# Patient Record
Sex: FEMALE | Race: BLACK OR AFRICAN AMERICAN | Employment: FULL TIME | URBAN - METROPOLITAN AREA
[De-identification: names, ages, dates, MRNs, and addresses within clinical notes are randomized per-mention and may not be internally consistent; named-entity substitution may affect disease eponyms.]

---

## 2017-01-06 ENCOUNTER — HOSPITAL ENCOUNTER (OUTPATIENT)
Dept: MAMMOGRAPHY | Age: 47
Discharge: HOME | End: 2017-01-06
Attending: OBSTETRICS & GYNECOLOGY
Payer: COMMERCIAL

## 2017-01-06 DIAGNOSIS — Z12.31 VISIT FOR SCREENING MAMMOGRAM: ICD-10-CM

## 2017-01-06 PROCEDURE — 77067 SCR MAMMO BI INCL CAD: CPT

## 2017-11-21 ENCOUNTER — TELEPHONE (OUTPATIENT)
Dept: FAMILY MEDICINE CLINIC | Age: 47
End: 2017-11-21

## 2017-11-21 NOTE — TELEPHONE ENCOUNTER
LM for return call    Called pt ans she states that she doesn't usually gets h/a's and this one was severe and came on quick. It was more to the center and front of her head. She felt chilled for a little bit. She took tylenol and that seem to help. It went away in a couple of hours. She denies dizziness or nausea or other sx's. Doesn't have any hangover effect. Will check with Dr Washington Found and see if she has any recommendations. No it was yesterday it only lasted a couple of hours and she feels fine now. Since she did not have any other neuro sx w/ it and subsided w/ Tylenol and she is feeling fine today, she does not need to go to the ED. Hard to know exactly what could have happened w/o hearing more details about it. I would have her monitor them for now, keep a log if the headaches are recurrent (track head pain location, rate the pain 1-10, log any associated symptoms and any triggers she might be able to identify) HOWEVER if she gets acute/severe pain again and/or any neuro sx, go to the ED. Also have OV w/ me for eval if she continues to have headaches. LM of above on pt's voice mail.

## 2017-11-21 NOTE — TELEPHONE ENCOUNTER
Patient is calling, she is requesting a call back from Dr. Ashley Lubin or her nurse, she states that she has a sudden start of a severe headache and would like to know what to do.      Best call back # for patient: [phone]

## 2018-01-10 ENCOUNTER — HOSPITAL ENCOUNTER (OUTPATIENT)
Dept: MAMMOGRAPHY | Age: 48
Discharge: HOME | End: 2018-01-10
Attending: OBSTETRICS & GYNECOLOGY
Payer: COMMERCIAL

## 2018-01-10 DIAGNOSIS — Z12.31 VISIT FOR SCREENING MAMMOGRAM: ICD-10-CM

## 2018-01-10 LAB — MAMMOGRAPHY, EXTERNAL: NORMAL

## 2018-01-10 PROCEDURE — 77067 SCR MAMMO BI INCL CAD: CPT

## 2018-11-08 ENCOUNTER — OFFICE VISIT (OUTPATIENT)
Dept: FAMILY MEDICINE CLINIC | Age: 48
End: 2018-11-08

## 2018-11-08 DIAGNOSIS — Z00.00 ROUTINE GENERAL MEDICAL EXAMINATION AT A HEALTH CARE FACILITY: Primary | ICD-10-CM

## 2018-11-08 NOTE — PATIENT INSTRUCTIONS

## 2018-11-08 NOTE — PROGRESS NOTES
Chief Complaint Patient presents with  Complete Physical  
  not fasting -has gyn for paps 1. Have you been to the ER, urgent care clinic since your last visit? Hospitalized since your last visit? No 
 
2. Have you seen or consulted any other health care providers outside of the [address] [place] [address] since your last visit? Include any pap smears or colon screening.   
Yes Dr Jenn Reyna

## 2018-11-08 NOTE — PROGRESS NOTES
HISTORY OF PRESENT ILLNESS  
HPI Annual CPE, not fasting today. Overall has been getting along well and feeling well in general. 
She sees gyn annually for well woman visits/gyn exams and had reportedly negative pap in 1-2018 Gets mammogram screens annually / [facility], also up to date in 1-2018. She had a hysterectomy in  for AUB and anemia due to uterine fibroids. She has therefore been off iron and mvi now that she no longer has the heavy periods. In general she feels well. Some cold intolerance. REVIEW OF SYMPTOMS  
  Review of Systems Constitutional: Negative. HENT: Negative. Eyes: Negative. Respiratory: Negative. Cardiovascular: Negative. Gastrointestinal: Negative. Genitourinary: Negative. Musculoskeletal: Negative. Skin: Negative. Neurological: Negative. Psychiatric/Behavioral: Negative.   
 
 
  
 PROBLEM LIST/MEDICAL HISTORY  
  
Problem List  Date Reviewed: 11/8/2018 Codes Class Noted Environmental and seasonal allergies ICD-10-CM: J30.89 ICD-9-CM: 477.8  7/8/2016 Iron deficiency anemia ICD-10-CM: D50.9 ICD-9-CM: 280.9  5/2/2016 Overview Signed 5/2/2016  2:04 PM by Mykel Zeng MD  
  Mild Screening for cardiovascular condition ICD-10-CM: Z13.6 ICD-9-CM: V81.2  3/27/2015 Overview Addendum 11/8/2018  3:00 PM by MD Dr Carmen Barnett [facility] [phone] 3[place]: 2D Echo, 24 Hour Holter Monitor, Cardiac MRI, ETT stress test, appts annually Family history of sudden cardiac death (SCD) ICD-10-CM: Z82.41 
ICD-9-CM: V17.41  3/27/2015 Family history of cardiac arrhythmia ICD-10-CM: Z82.49 
ICD-9-CM: V17.49  3/27/2015 Vitamin D deficiency ICD-10-CM: E55.9 ICD-9-CM: 268.9  5/13/2011 Overview Signed 5/13/2011 12:17 PM by Mykel Zeng MD  
  6/5028 Seasonal allergic rhinitis ICD-10-CM: J30.2 ICD-9-CM: 477.9  1/29/2010 Overview Signed 2011 12:17 PM by Mary Fernandes MD  
  Spring/ Eczema ICD-10-CM: L30.9 ICD-9-CM: 692.9  2010  
   
  
 
 
  PAST SURGICAL HISTORY  
   
Past Surgical History:  
Procedure Laterality Date  HX BREAST BIOPSY Right   
 benign  HX  SECTION  3/2008, 2006  HX MYOMECTOMY  2002  HX TONSIL AND ADENOIDECTOMY  48  
 age 17  
[facility] TONSIL AND ADENOIDECTOMY  HX TOTAL LAPAROSCOPIC HYSTERECTOMY  2016 Uterine Fibroids, Dr Ruel Jara  [facility] BX BREAST RT 1ST LESION W/CLIP AND SPECIMEN Right 2006  
 benign MEDICATIONS  
  
Current Outpatient Medications Medication Sig  cetirizine (ZYRTEC) 10 mg tablet Take 10 mg by mouth daily as needed. No current facility-administered medications for this visit. ALLERGIES No Known Allergies SOCIAL HISTORY  
   
Social History Socioeconomic History  Marital status:  Spouse name: Not on file  Number of children: 2  
 Years of education: Not on file  Highest education level: Not on file Social Needs  Financial resource strain: Not on file  Food insecurity - worry: Not on file  Food insecurity - inability: Not on file  Transportation needs - medical: Not on file  Transportation needs - non-medical: Not on file Occupational History  Occupation: Dermatologist  
Tobacco Use  Smoking status: Never Smoker  Smokeless tobacco: Never Used Substance and Sexual Activity  Alcohol use: Yes Comment: RARELY  Drug use: No  
 Sexual activity: Yes  
  Partners: Male Comment: Stopped Seasonique BCP May 2013;  had Vasectomy Other Topics Concern [address] Service Not Asked  Blood Transfusions Not Asked  Caffeine Concern No  
  Comment: 1-2 cups of coffee a day  Occupational Exposure Not Asked Filiberto Callahan Hazards Not Asked  Sleep Concern Not Asked  Stress Concern Not Asked  Weight Concern No  
 Special Diet No  
 Back Care Not Asked  Exercise No  
  Comment: runs sporadically, maybe once every 1-2 weeks, used to run 2-3 x a week  Bike Helmet Not Asked  Seat Belt Not Asked  Self-Exams Not Asked Social History Narrative 2 sons  
 
  
IMMUNIZATIONS Immunization History Administered Date(s) Administered  Influenza Vaccine 09/01/2018  TDAP Vaccine 02/25/2009 FAMILY HISTORY Family History Problem Relation Age of Onset  Hypertension Mother  Thyroid Disease Mother 79 Hypothyroidism [facility] Other Mother PVD  Hypertension Father  Heart Disease Father A. Fib  
 Parkinson's Disease Father  Arrhythmia Father AFIB  Asthma Son  Diabetes Maternal Grandmother  Heart Disease Maternal Grandmother  Heart Disease Maternal Grandfather  Heart Disease Paternal Grandmother [de-identified]  
 Heart Disease Brother 25  
     sudden death, cardiac arrhythmia  
 Heart Disease Brother A. Fib.  Arrhythmia Brother AFIB  Heart Attack Sister 47  
     sudden MI, successfully resuscitate, defibrillator in place  Anesth Problems Neg Hx Carlosa Fay Visit Vitals Ht (P) 5' 5.5\" (1.664 m) Wt (P) 145 lb 3.2 oz (65.9 kg) LMP 11/16/2016 BMI (P) 23.80 kg/m² PHYSICAL EXAMINATION  
  Physical Exam  
Constitutional: She is oriented to person, place, and time and well-developed, well-nourished, and in no distress. HENT:  
Right Ear: Tympanic membrane normal.  
Left Ear: Tympanic membrane normal.  
Nose: Nose normal.  
Mouth/Throat: Oropharynx is clear and moist. No oropharyngeal exudate. Eyes: Conjunctivae and EOM are normal. Pupils are equal, round, and reactive to light. Neck: Neck supple. No thyromegaly present. Cardiovascular: Normal rate, regular rhythm and normal heart sounds. No murmur heard.  
Pulmonary/Chest: Effort normal and breath sounds normal.  
 Abdominal: Soft. Bowel sounds are normal. She exhibits no distension and no mass. There is no tenderness. Musculoskeletal: Normal range of motion. She exhibits no edema or tenderness. Lymphadenopathy:  
  She has no cervical adenopathy. Neurological: She is alert and oriented to person, place, and time. Gait normal.  
Psychiatric: Mood and affect normal.  
Vitals reviewed. 
 
 
  
 
 ASSESSMENT & PLAN  
 
  ICD-10-CM ICD-9-CM 1. Routine general medical examination at a health care facility Z00.00 V70.0 CBC W/O DIFF  
   LIPID PANEL  
   METABOLIC PANEL, COMPREHENSIVE  
   TSH 3RD GENERATION  
   VITAMIN D, 25 HYDROXY She will RTC fasting for the above labs in the next week Reviewed diet, nutrition, exercise, weight management, BMI/goals, age/risk based screening recommendations, health maintenance & prevention counseling. Colorectal cancer screening guidelines per USPTFS and ACS r/w pt today. She is otherwise low risk, no family hx, and will check to see if her insurance covers screening down to age 41 or 50 for baseline. She sees gyn annually for well woman visits/gyn exams and had reportedly negative pap in 1-2018 Gets mammogram screens annually w/ [employer], also up to date in 1-2018. She had a hysterectomy in  for AUB and anemia due to uterine fibroids. Further follow up & other recommendations pending review of labs.  If all remains good and stable, follow up in 1yr, sooner prn

## 2019-02-01 ENCOUNTER — HOSPITAL ENCOUNTER (OUTPATIENT)
Dept: MAMMOGRAPHY | Age: 49
Discharge: HOME | End: 2019-02-01
Attending: FAMILY MEDICINE
Payer: COMMERCIAL

## 2019-02-01 DIAGNOSIS — Z12.31 ENCOUNTER FOR MAMMOGRAM TO ESTABLISH BASELINE MAMMOGRAM: ICD-10-CM

## 2019-02-01 PROCEDURE — 77063 BREAST TOMOSYNTHESIS BI: CPT

## 2019-04-26 LAB
25(OH)D3+25(OH)D2 SERPL-MCNC: 10.5 NG/ML (ref 30–100)
ALBUMIN SERPL-MCNC: 4.3 G/DL (ref 3.5–5.5)
ALBUMIN/GLOB SERPL: 1.9 {RATIO} (ref 1.2–2.2)
ALP SERPL-CCNC: 38 IU/L (ref 39–117)
ALT SERPL-CCNC: 14 IU/L (ref 0–32)
AST SERPL-CCNC: 13 IU/L (ref 0–40)
BILIRUB SERPL-MCNC: 0.4 MG/DL (ref 0–1.2)
BUN SERPL-MCNC: 11 MG/DL (ref 6–24)
BUN/CREAT SERPL: 15 (ref 9–23)
CALCIUM SERPL-MCNC: 9.3 MG/DL (ref 8.7–10.2)
CHLORIDE SERPL-SCNC: 105 MMOL/L (ref 96–106)
CHOLEST SERPL-MCNC: 167 MG/DL (ref 100–199)
CO2 SERPL-SCNC: 25 MMOL/L (ref 20–29)
CREAT SERPL-MCNC: 0.74 MG/DL (ref 0.57–1)
ERYTHROCYTE [DISTWIDTH] IN BLOOD BY AUTOMATED COUNT: 13.9 % (ref 12.3–15.4)
GLOBULIN SER CALC-MCNC: 2.3 G/DL (ref 1.5–4.5)
GLUCOSE SERPL-MCNC: 84 MG/DL (ref 65–99)
HCT VFR BLD AUTO: 36.8 % (ref 34–46.6)
HDLC SERPL-MCNC: 61 MG/DL
HGB BLD-MCNC: 12.2 G/DL (ref 11.1–15.9)
INTERPRETATION, 910389: NORMAL
LDLC SERPL CALC-MCNC: 98 MG/DL (ref 0–99)
MCH RBC QN AUTO: 29.5 PG (ref 26.6–33)
MCHC RBC AUTO-ENTMCNC: 33.2 G/DL (ref 31.5–35.7)
MCV RBC AUTO: 89 FL (ref 79–97)
PLATELET # BLD AUTO: 216 X10E3/UL (ref 150–379)
POTASSIUM SERPL-SCNC: 4.6 MMOL/L (ref 3.5–5.2)
PROT SERPL-MCNC: 6.6 G/DL (ref 6–8.5)
RBC # BLD AUTO: 4.13 X10E6/UL (ref 3.77–5.28)
SODIUM SERPL-SCNC: 143 MMOL/L (ref 134–144)
TRIGL SERPL-MCNC: 41 MG/DL (ref 0–149)
TSH SERPL DL<=0.005 MIU/L-ACNC: 2.15 UIU/ML (ref 0.45–4.5)
VLDLC SERPL CALC-MCNC: 8 MG/DL (ref 5–40)
WBC # BLD AUTO: 4.1 X10E3/UL (ref 3.4–10.8)

## 2019-05-19 ENCOUNTER — PATIENT MESSAGE (OUTPATIENT)
Dept: FAMILY MEDICINE CLINIC | Age: 49
End: 2019-05-19

## 2020-07-02 ENCOUNTER — HOSPITAL ENCOUNTER (OUTPATIENT)
Dept: MAMMOGRAPHY | Age: 50
Discharge: HOME | End: 2020-07-02
Attending: FAMILY MEDICINE
Payer: COMMERCIAL

## 2020-07-02 DIAGNOSIS — Z12.31 BREAST CANCER SCREENING BY MAMMOGRAM: ICD-10-CM

## 2020-07-02 PROCEDURE — 77063 BREAST TOMOSYNTHESIS BI: CPT

## 2020-10-19 ENCOUNTER — HOSPITAL ENCOUNTER (OUTPATIENT)
Dept: PREADMISSION TESTING | Age: 50
Discharge: HOME | End: 2020-10-19
Payer: COMMERCIAL

## 2020-10-19 PROCEDURE — 87635 SARS-COV-2 COVID-19 AMP PRB: CPT

## 2020-10-20 LAB
HEALTH STATUS, XMCV2T: NORMAL
SARS-COV-2, COV2NT: NOT DETECTED
SOURCE, COVRS: NORMAL
SPECIMEN SOURCE, FCOV2M: NORMAL
SPECIMEN TYPE, XMCV1T: NORMAL

## 2020-10-23 ENCOUNTER — ANESTHESIA (OUTPATIENT)
Dept: ENDOSCOPY | Age: 50
End: 2020-10-23
Payer: COMMERCIAL

## 2020-10-23 ENCOUNTER — ANESTHESIA EVENT (OUTPATIENT)
Dept: ENDOSCOPY | Age: 50
End: 2020-10-23
Payer: COMMERCIAL

## 2020-10-23 ENCOUNTER — HOSPITAL ENCOUNTER (OUTPATIENT)
Age: 50
Setting detail: OUTPATIENT SURGERY
Discharge: HOME | End: 2020-10-23
Attending: INTERNAL MEDICINE | Admitting: INTERNAL MEDICINE
Payer: COMMERCIAL

## 2020-10-23 VITALS
WEIGHT: 145 LBS | OXYGEN SATURATION: 100 % | SYSTOLIC BLOOD PRESSURE: 120 MMHG | HEIGHT: 65 IN | BODY MASS INDEX: 24.16 KG/M2 | TEMPERATURE: 97.6 F | HEART RATE: 60 BPM | DIASTOLIC BLOOD PRESSURE: 56 MMHG | RESPIRATION RATE: 16 BRPM

## 2020-10-23 PROCEDURE — 74011000250 HC RX REV CODE- 250: Performed by: NURSE ANESTHETIST, CERTIFIED REGISTERED

## 2020-10-23 PROCEDURE — 76040000007: Performed by: INTERNAL MEDICINE

## 2020-10-23 PROCEDURE — 74011250636 HC RX REV CODE- 250/636: Performed by: NURSE ANESTHETIST, CERTIFIED REGISTERED

## 2020-10-23 PROCEDURE — 74011250636 HC RX REV CODE- 250/636: Performed by: INTERNAL MEDICINE

## 2020-10-23 PROCEDURE — 76060000032 HC ANESTHESIA 0.5 TO 1 HR: Performed by: INTERNAL MEDICINE

## 2020-10-23 PROCEDURE — 2709999900 HC NON-CHARGEABLE SUPPLY: Performed by: INTERNAL MEDICINE

## 2020-10-23 RX ORDER — LIDOCAINE HYDROCHLORIDE 20 MG/ML
5 SOLUTION OROPHARYNGEAL AS NEEDED
Status: DISCONTINUED | OUTPATIENT
Start: 2020-10-23 | End: 2020-10-23 | Stop reason: HOSPADM

## 2020-10-23 RX ORDER — DEXTROMETHORPHAN/PSEUDOEPHED 2.5-7.5/.8
1.2 DROPS ORAL
Status: DISCONTINUED | OUTPATIENT
Start: 2020-10-23 | End: 2020-10-23 | Stop reason: HOSPADM

## 2020-10-23 RX ORDER — SODIUM CHLORIDE 0.9 % (FLUSH) 0.9 %
5-40 SYRINGE (ML) INJECTION EVERY 8 HOURS
Status: DISCONTINUED | OUTPATIENT
Start: 2020-10-23 | End: 2020-10-23 | Stop reason: HOSPADM

## 2020-10-23 RX ORDER — SODIUM CHLORIDE 9 MG/ML
100 INJECTION, SOLUTION INTRAVENOUS CONTINUOUS
Status: DISCONTINUED | OUTPATIENT
Start: 2020-10-23 | End: 2020-10-23 | Stop reason: HOSPADM

## 2020-10-23 RX ORDER — ATROPINE SULFATE 0.1 MG/ML
0.5 INJECTION INTRAVENOUS
Status: DISCONTINUED | OUTPATIENT
Start: 2020-10-23 | End: 2020-10-23 | Stop reason: HOSPADM

## 2020-10-23 RX ORDER — SODIUM CHLORIDE 0.9 % (FLUSH) 0.9 %
5-40 SYRINGE (ML) INJECTION AS NEEDED
Status: DISCONTINUED | OUTPATIENT
Start: 2020-10-23 | End: 2020-10-23 | Stop reason: HOSPADM

## 2020-10-23 RX ORDER — FENTANYL CITRATE 50 UG/ML
100 INJECTION, SOLUTION INTRAMUSCULAR; INTRAVENOUS ONCE
Status: DISCONTINUED | OUTPATIENT
Start: 2020-10-23 | End: 2020-10-23 | Stop reason: HOSPADM

## 2020-10-23 RX ORDER — MIDAZOLAM HYDROCHLORIDE 1 MG/ML
5 INJECTION, SOLUTION INTRAMUSCULAR; INTRAVENOUS
Status: DISCONTINUED | OUTPATIENT
Start: 2020-10-23 | End: 2020-10-23 | Stop reason: HOSPADM

## 2020-10-23 RX ORDER — LIDOCAINE HYDROCHLORIDE 20 MG/ML
INJECTION, SOLUTION EPIDURAL; INFILTRATION; INTRACAUDAL; PERINEURAL AS NEEDED
Status: DISCONTINUED | OUTPATIENT
Start: 2020-10-23 | End: 2020-10-23 | Stop reason: HOSPADM

## 2020-10-23 RX ORDER — SODIUM CHLORIDE 9 MG/ML
INJECTION, SOLUTION INTRAVENOUS
Status: DISCONTINUED | OUTPATIENT
Start: 2020-10-23 | End: 2020-10-23 | Stop reason: HOSPADM

## 2020-10-23 RX ORDER — NALOXONE HYDROCHLORIDE 0.4 MG/ML
0.4 INJECTION, SOLUTION INTRAMUSCULAR; INTRAVENOUS; SUBCUTANEOUS
Status: DISCONTINUED | OUTPATIENT
Start: 2020-10-23 | End: 2020-10-23 | Stop reason: HOSPADM

## 2020-10-23 RX ORDER — GLYCOPYRROLATE 0.2 MG/ML
INJECTION INTRAMUSCULAR; INTRAVENOUS AS NEEDED
Status: DISCONTINUED | OUTPATIENT
Start: 2020-10-23 | End: 2020-10-23 | Stop reason: HOSPADM

## 2020-10-23 RX ORDER — PROPOFOL 10 MG/ML
INJECTION, EMULSION INTRAVENOUS AS NEEDED
Status: DISCONTINUED | OUTPATIENT
Start: 2020-10-23 | End: 2020-10-23 | Stop reason: HOSPADM

## 2020-10-23 RX ORDER — DEXTROSE MONOHYDRATE AND SODIUM CHLORIDE 5; .9 G/100ML; G/100ML
100 INJECTION, SOLUTION INTRAVENOUS CONTINUOUS
Status: DISCONTINUED | OUTPATIENT
Start: 2020-10-23 | End: 2020-10-23 | Stop reason: HOSPADM

## 2020-10-23 RX ORDER — EPINEPHRINE 0.1 MG/ML
1 INJECTION INTRACARDIAC; INTRAVENOUS
Status: DISCONTINUED | OUTPATIENT
Start: 2020-10-23 | End: 2020-10-23 | Stop reason: HOSPADM

## 2020-10-23 RX ORDER — FLUMAZENIL 0.1 MG/ML
0.2 INJECTION INTRAVENOUS
Status: DISCONTINUED | OUTPATIENT
Start: 2020-10-23 | End: 2020-10-23 | Stop reason: HOSPADM

## 2020-10-23 RX ADMIN — PROPOFOL 20 MG: 10 INJECTION, EMULSION INTRAVENOUS at 09:50

## 2020-10-23 RX ADMIN — LIDOCAINE HYDROCHLORIDE 50 MG: 20 INJECTION, SOLUTION EPIDURAL; INFILTRATION; INTRACAUDAL; PERINEURAL at 09:33

## 2020-10-23 RX ADMIN — SODIUM CHLORIDE 100 ML/HR: 900 INJECTION, SOLUTION INTRAVENOUS at 09:30

## 2020-10-23 RX ADMIN — PROPOFOL 60 MG: 10 INJECTION, EMULSION INTRAVENOUS at 09:33

## 2020-10-23 RX ADMIN — PROPOFOL 20 MG: 10 INJECTION, EMULSION INTRAVENOUS at 09:48

## 2020-10-23 RX ADMIN — PROPOFOL 20 MG: 10 INJECTION, EMULSION INTRAVENOUS at 09:56

## 2020-10-23 RX ADMIN — PROPOFOL 20 MG: 10 INJECTION, EMULSION INTRAVENOUS at 09:38

## 2020-10-23 RX ADMIN — PROPOFOL 20 MG: 10 INJECTION, EMULSION INTRAVENOUS at 09:35

## 2020-10-23 RX ADMIN — GLYCOPYRROLATE 0.2 MG: 0.2 INJECTION, SOLUTION INTRAMUSCULAR; INTRAVENOUS at 09:33

## 2020-10-23 RX ADMIN — PROPOFOL 20 MG: 10 INJECTION, EMULSION INTRAVENOUS at 09:54

## 2020-10-23 RX ADMIN — PROPOFOL 20 MG: 10 INJECTION, EMULSION INTRAVENOUS at 09:52

## 2020-10-23 RX ADMIN — PROPOFOL 20 MG: 10 INJECTION, EMULSION INTRAVENOUS at 09:37

## 2020-10-23 RX ADMIN — SODIUM CHLORIDE: 900 INJECTION, SOLUTION INTRAVENOUS at 09:23

## 2020-10-23 RX ADMIN — PROPOFOL 40 MG: 10 INJECTION, EMULSION INTRAVENOUS at 09:42

## 2020-10-23 RX ADMIN — PROPOFOL 40 MG: 10 INJECTION, EMULSION INTRAVENOUS at 09:46

## 2020-10-23 RX ADMIN — PROPOFOL 20 MG: 10 INJECTION, EMULSION INTRAVENOUS at 09:45

## 2020-10-23 RX ADMIN — PROPOFOL 20 MG: 10 INJECTION, EMULSION INTRAVENOUS at 09:34

## 2020-10-23 NOTE — ANESTHESIA PREPROCEDURE EVALUATION
Relevant Problems   No relevant active problems       Anesthetic History   No history of anesthetic complications            Review of Systems / Medical History  Patient summary reviewed, nursing notes reviewed and pertinent labs reviewed    Pulmonary  Within defined limits                 Neuro/Psych   Within defined limits           Cardiovascular  Within defined limits                Exercise tolerance: >4 METS     GI/Hepatic/Renal  Within defined limits              Endo/Other  Within defined limits           Other Findings              Physical Exam    Airway  Mallampati: II  TM Distance: 4 - 6 cm  Neck ROM: normal range of motion   Mouth opening: Normal     Cardiovascular  Regular rate and rhythm,  S1 and S2 normal,  no murmur, click, rub, or gallop             Dental  No notable dental hx       Pulmonary  Breath sounds clear to auscultation               Abdominal  GI exam deferred       Other Findings            Anesthetic Plan    ASA: 2  Anesthesia type: general and total IV anesthesia          Induction: Intravenous  Anesthetic plan and risks discussed with: Patient      Propofol MAC

## 2020-10-23 NOTE — ANESTHESIA POSTPROCEDURE EVALUATION
Procedure(s):  COLONOSCOPY. general, total IV anesthesia    Anesthesia Post Evaluation        Patient location during evaluation: PACU  Note status: Adequate. Level of consciousness: responsive to verbal stimuli and sleepy but conscious  Pain management: satisfactory to patient  Airway patency: patent  Anesthetic complications: no  Cardiovascular status: acceptable  Respiratory status: acceptable  Hydration status: acceptable  Comments: +Post-Anesthesia Evaluation and Assessment    Patient: Nancy Jay MRN: [id number]  SSN: [id number]   YOB: 1970  Age: 52 y.o. Sex: female      Cardiovascular Function/Vital Signs    BP (!) 120/56   Pulse 60   Temp 36.4 °C (97.6 °F)   Resp 16   Ht 5' 5\" (1.651 m)   Wt 65.8 kg (145 lb)   SpO2 100%   BMI 24.13 kg/m²     Patient is status post Procedure(s):  COLONOSCOPY. Nausea/Vomiting: Controlled. Postoperative hydration reviewed and adequate. Pain:  Pain Scale 1: Numeric (0 - 10) (10/23/20 1022)  Pain Intensity 1: 0 (10/23/20 1022)   Managed. Neurological Status: At baseline. Mental Status and Level of Consciousness: Arousable. Pulmonary Status:   O2 Device: Room air (10/23/20 1022)   Adequate oxygenation and airway patent. Complications related to anesthesia: None    Post-anesthesia assessment completed. No concerns. Signed By: Jo-Ann Bernal MD    10/23/2020  Post anesthesia nausea and vomiting:  controlled      INITIAL Post-op Vital signs:   Vitals Value Taken Time   /79 10/23/2020 10:30 AM   Temp 36.4 °C (97.6 °F) 10/23/2020 10:22 AM   Pulse 56 10/23/2020 10:31 AM   Resp 17 10/23/2020 10:31 AM   SpO2 100 % 10/23/2020 10:31 AM   Vitals shown include unvalidated device data.

## 2020-10-23 NOTE — DISCHARGE INSTR - LAB
Endoscopy Discharge Instructions Dr. Otis Schirmer [place] office  NAME: Huyen Jaffe RECORD QCHOKJ:[id number] AGE:  52 y.o. YOB: 1970 FINAL Discharge Procedure and Diagnosis:   
  
Procedure(s): 
COLONOSCOPY FINDINGS:    
Normal exam                    
  
 
  
  
   
 
   MEDICATIONS [x] CONTINUE CURRENT MEDICATIONS [] NEW MEDICATIONS 1.  
 2.  
 3.  
   
 
Testing Schedule Colonoscopy Screening                                   Recommendations 
 
   []  Repeat colonoscopy in 6-12 month 2nd        to Inadequate  prep  
 []  Repeat colonoscopy in 3 years  
 []  Repeat colonoscopy in 5 years  
 [x]  Repeat colonoscopy in 10 years yearly stools for occult blood New additional 
Tests Call the office  
([phone]) for the appointment time    
 []  
 
 []  
 
 []  
  
  
   
  
                     [address] [facility]:   
                                                                                                                 
 
 
    
 [x]   None follow up with pcp []  1 week     
 []   2 week  
 []  1 month Always keep Dara Mariscal MD for regular medical follow up If you had a colonoscopy the \"C\" indicates specific instructions    
  
x                                           Diet Instructions : 
 Ordinarily you may resume your previous diet but your initial diet should be       Light your discharge nurse will go over this with you. Large meals can cause  abdominal discomfort after these procedures. Specific Diet Recommendations:  
     [x] High fiber diet. https://www.indoo.rs/. com/diets/ 
 
    [] GERD diet: avoid fried and fatty foods, peppermint, chocolate, alcohol,    
          coffee, citrus fruits and juices, and tomato products. Avoid lying down for 
          2 to 3  hours after eating. https://www.small.com/. com/diets/      
     []  FODMAP DIET  DeathUnit.nl      
 
     []  All diets eg high fiber, gastroparesis. , weight loss , gluten free 1. Flazio 2.  https://www.SAFCell. [employer]/diets/  
       
__x__  Yamila Castillo may feel quite tired and need to rest and recuperate for several hours    following these procedures. __x__  Due to the fact that sedation was administered for this procedure, do not drive,   operate machinery or sign legal documents for the next 24 hours. __x__  Mild abdominal pain may be experienced after your procedure, but is should   disappear after several hours. Notify your physician if you have persistent pain,   tenderness or abdominal distension. __x__  C Many patients for the first few hours following the exam may experience         belching or passing gas through the rectum. Walking may help to relieve      
 distention and gas pains. A warm bath or shower will often help with abdominal  cramping.                                                                                        
  
__x__   Yamila Castillo may return to your normal routine tomorrow, according to how you feel        and depending on your doctors instructions. Be sure to call your doctor to make  an appointment for a post-surgery check-up on the date your doctor has   requested. __x__ C  Rectal bleeding or spotting in small amounts may occur with the first bowel   movement following a colonoscopy or sigmoidoscopy. If a large amount of blood is noted call immediately __x__  Mamie Fung may experience a numbness or lack of sensation in throat. If present, do not   
 eat or drink. Before eating, test your ability to drink with small sips of water. Y     You may try clear liquids or soups. If you tolerate these, you may then eat solid     food which is not greasy or spicy. __x__ C   
 IF POLYPS REMOVED: Avoid any blood thinning medication such as plavix,   aspirin or coumadin  NSAIDS (like advil or alleve) for 7 days. __x__  Notify your physician if you cough or vomit blood or experience chest pain. Your biopsy or testing result should be available in 7-10 days Prescription will be electronically sent to your pharmacy you must  
  let your nurse know your pharmacy:  
                                                                                                                               
 
     [address] TO HELP ENSURE A SMOOTH RECOVERY,  
    IT IS IMPORTANT TO FOLLOW THEM. _x___Pamphlet /Educational Information provided for diagnostic findings Additional education information can assessed at the sites below: 
 [facility] 
 http://www.digestive. niddk.nih.gov/ddiseases/a-z.asp Web MD patient information Signature of individual given instructions : 
 Date: 10/23/2020

## 2020-10-23 NOTE — PROCEDURES
G I Procedure Note            COLONOSCOPY   Dr. Jean Paul [facility] office   [facility]  [address]                                   [id number]                                  [id number]   1970                                      52 y.o.                                    female      Procedure Date: 10/23/2020   [x]  Anesthesia MAC                                                                                                Pre Op Diagnosis:    Indications:                   1. SCREENING                                                                                                                                                                          Post Op Diagnosis:                    1.   Normal    Except grade 1 hemorrhoids                                                                        H&p completed: Yes            Anesthesia Assessment: Performed prior to procedure:      No change  Anesthesia Plan: Performed prior to procedure:                   No change       Medications: See Reviewed List and Reconcilation           Informed consent was obtained     Risk Statement:  Prior to the procedure the risks were explained to the patient and/or to the family including but not limited to perforation, bleeding, adverse drug reaction, aspiration, and even the need for possible surgery. A colonoscopy exam is not 100% accurate which may be related to preparation or blind spots during the exam.The possibility that an abnormality and /or cancer could be missed was also discussed as well as alternative x-ray options.          Instrument:    Olympus adult Videocolonoscope                                   Immediate Procedure Reassessment Completed     With the patient in the left lateral position, a rectal examination was performed and the findings were: negative without mass, lesions or tenderness   The Olympus Video colonoscope was inserted under direct vision into the rectum. The colonoscope was passed from the rectum to the cecum, which was identified by the ileocecal valve. The colon findings demonstrated:  ANUS: Anal exam reveals no masses or external hemorrhoids, sphincter tone is normal.   RECTUM: Rectal exam reveals no masses  . SIGMOID COLON: The sigmoid was unremarkable except as noted below   Findings below   DESCENDING COLON:  The videoscolonoscope was advanced carefully. Findings below  SPLENIC FLEXURE: The splenic flexure is normal.   TRANSVERSE COLON:  The typical triangular pattern was noted. Findings below      HEPATIC FLEXURE: The hepatic flexure is normal.   ASCENDING COLON:  No  bleeding Findings below     CECUM:  The ileocecal valve appears normal.   TERMINAL ILEUM: The terminal ileum was not entered. Finding noted      [x] mucosa normal      [] Diverticulosis     [] avm     [] Additional findings:      A     The colonoscope was slowly withdrawn >6 minute period and the instrument was retroflexed in the rectum. The rectal findings were:Protruding lesions:     -Internal Hemorrhoids  The patient tolerated the entire procedure well. Blood Loss nil  No complications  Anesthesia  MAC  No crystalloids  No Implants  Assistants : per nursing documentation team members     For biopsy  Specimen verification by physician and nurse two sources, name,           social security numbers     Colon preparation was fair    Recommendations:     - For colon cancer screening in this average-risk patient, colonoscopy may be repeated in 10 years.      - yearly stools for occult blood      Copies sent to   Libra Cason MD  CC:  Vijaya Anderson MD

## 2020-10-23 NOTE — H&P
G I Procedure Note           Endoscopy History and Physical               Dr. Read [facility] Office     [facility] -  [address] [id number]  [id number]    1970  52 y.o.  female      Date of Procedure:   Preoperative Diagnosis:       Procedure:    10/23/2020           SCREENING                              Procedure(s):  COLONOSCOPY      Gastroenterologist:  Anesthesia:           MD PETE Chaudhari            History and procedure indication:  Hugo Hansen is a 52 y.o. BLACK OR  female who presents with: SCREENING   including the additional history of Screening ,Screening for colon cancer,,        Past Medical History:   Diagnosis Date    Anemia NEC     Contact dermatitis and other eczema, due to unspecified cause     Environmental and seasonal allergies 7/8/2016    Family history of cardiac arrhythmia 3/27/2015    Family history of sudden cardiac death (SCD) 3/27/2015    Hx of mammogram 01/10/2018    Dr Sarbjit Ba mammographic evidence of malignancy-repeat in one year    Iron deficiency anemia 5/2/2016    Mild     Menopause     [id number]    Menorrhagia due to fibroids 7/8/2016    Gyn Dr Ana Wahl for cardiovascular condition 3/27/2015    Dr Jacobo Zamudio [facility] 1-2015 through 3-2015: 2D Echo, 24 Hour Holter Monitor, Cardiac MRI, ETT stress test    Uterine Fibroids 7/8/2016    Multiple; s/p myomectomy 2002, then recurred    Vitamin D deficiency 5/13/2011      Prior to Admission medications    Medication Sig Start Date End Date Taking? Authorizing Provider   cetirizine (ZYRTEC) 10 mg tablet Take 10 mg by mouth daily as needed.  5/13/11  Yes Provider, Historical     No Known Allergies    Past Surgical History:   Procedure Laterality Date    HX BREAST BIOPSY Right 2007    Surgical Bx - Benign     HX  SECTION  3/2008, 2006    HX HYSTERECTOMY  2016    HX MYOMECTOMY  2002    HX TONSIL AND ADENOIDECTOMY  1984    age 18    HX TONSIL AND ADENOIDECTOMY      HX TOTAL LAPAROSCOPIC HYSTERECTOMY  2016    Uterine Fibroids, Dr Christopher Yoo    [facility] BX BREAST RT 1ST LESION W/CLIP AND SPECIMEN Right 2006    Benign     Family History   Problem Relation Age of Onset    Hypertension Mother     Thyroid Disease Mother 79        Hypothyroidism    Other Mother         PVD    Hypertension Father     Heart Disease Father         A. Fib    Parkinson's Disease Father     Arrhythmia Father         AFIB    Asthma Son     Diabetes Maternal Grandmother     Heart Disease Maternal Grandmother     Heart Disease Maternal Grandfather     Heart Disease Paternal Grandmother [de-identified]    Heart Disease Brother 25        sudden death, cardiac arrhythmia    Heart Disease Brother         A. Fib.     Arrhythmia Brother         AFIB    Heart Attack Sister 47        sudden MI, successfully resuscitate, defibrillator in place    Anesth Problems Neg Hx       Social History     Tobacco Use    Smoking status: Never Smoker    Smokeless tobacco: Never Used   Substance Use Topics    Alcohol use: Yes     Comment: RARELY                                                      PHYSICAL EXAM     Visit Vitals  LMP 2016 (Exact Date)       General appearance:  alert, well appearing, and in no distress  Mental status:  normal mood, behavior, speech, dress, motor activity and thought processes  Nose:      normal and patent, no erythema, discharge or polyps  Mouth:- mucous membranes moist, pharynx normal without lesions                  [x]  No Loose teeth      []    Loose teeth  Finger opening:  []1     []1.5    [] 2     [] 2.5     [x] 3      [] 3.5     [] 4   Mallampati:         [] Class 1     [x] Class 2    [] Class 3      [] Class 4      Neck - supple,      [x] Full ROM [] Decreased ROM  [] Short Neck no significant adenopathy    Chest - clear to auscultation, no wheezes, rales or rhonchi, symmetric air entry  Heart: normal rate, regular rhythm, normal S1, S2, no murmurs, rubs, clicks or gallops  Abdomen: abdomen soft, bowel sounds  [x] normal  [] increased  [] hypoactive                       [] no tenderness  [] epigastric tenderness  [] LLQ tenderness   [] RLQ tenderness                      No masses, organomegaly or guarding. Rectal exam: negative without mass, lesions or tenderness  Extremities: peripheral pulses normal, no pedal edema, no clubbing or cyanosis  Neurologic: Alert and oriented to person, place, and time; normal strength and tone. Normal symmetric reflexes  Normal gait:                                      Assessement:                                 Pre op dx:  SCREENING   Additional medical problems list below   Patient Active Problem List   Diagnosis Code    Seasonal allergic rhinitis J30.2    Eczema L30.9    Vitamin D deficiency E55.9    Screening for cardiovascular condition Z13.6    Family history of sudden cardiac death (SCD) Z80.45    Family history of cardiac arrhythmia Z82.49    Iron deficiency anemia D50.9    Environmental and seasonal allergies J30.89                                                                                           This note documentation was performed prior to this planned procedure       after a history and physical was performed in the office.          Date: 9 29 2020   Office exam   10/23/2020 Immediate update no changes in H&P                        Pre Procedure Evaluation (per anesthesia or per h&p)                                                Sedation/Assessment:                                                                                               Mallampati Classification                            []Class 1                    []Class 2                    [] Class 3                  [] Class 4 ASA classfication         []     Class I: Normally healthy         []     Class II: Patient with mild systemic disease (e.g. hypertension)         []     Class III: Patient with severe systemic disease (e.g. CHF), non-decompensated         []     Class IV: Patient with severe systemic disease, decompensated         []     Class V: Moribund patient, survival unlikely                     Plan:  []  Egd                                 [x] Colonoscopy                               [] with Moderate Sedation /Conscious Sedation                                 [x] MAC          Patient stable for planned procedure. See orders.      Salvatore Livingston MD

## 2020-10-23 NOTE — ROUTINE PROCESS
Karyle Bridgeman 1970 
[id number] Situation: 
Verbal report received from: NARCISA Lombardo RN Procedure: Procedure(s): 
COLONOSCOPY Background: 
 
Preoperative diagnosis: SCREENING Postoperative diagnosis: Normal 
 
:  Dr. UVALDO CLOUD [facility] Assistant(s): Endoscopy RN-1: Micaela Lombardo, RN; Filemon Denton Specimens: * No specimens in log * H. Pylori  no Assessment: Anesthesia gave intra-procedure sedation and medications, see anesthesia flow sheet yes Intravenous fluids: NS@ [facility] Vital signs stable Abdominal assessment: round and soft Recommendation: 
Discharge patient per MD order. Family or Friend Permission to share finding with family or friend

## 2021-06-11 ENCOUNTER — TRANSCRIBE ORDER (OUTPATIENT)
Dept: SCHEDULING | Age: 51
End: 2021-06-11

## 2021-06-11 DIAGNOSIS — Z12.31 VISIT FOR SCREENING MAMMOGRAM: Primary | ICD-10-CM

## 2021-06-24 ENCOUNTER — OFFICE VISIT (OUTPATIENT)
Dept: FAMILY MEDICINE CLINIC | Age: 51
End: 2021-06-24
Payer: COMMERCIAL

## 2021-06-24 VITALS
SYSTOLIC BLOOD PRESSURE: 116 MMHG | RESPIRATION RATE: 18 BRPM | BODY MASS INDEX: 23.93 KG/M2 | DIASTOLIC BLOOD PRESSURE: 84 MMHG | TEMPERATURE: 98 F | WEIGHT: 143.6 LBS | HEART RATE: 60 BPM | OXYGEN SATURATION: 98 % | HEIGHT: 65 IN

## 2021-06-24 DIAGNOSIS — H57.12 LEFT EYE PAIN: Primary | ICD-10-CM

## 2021-06-24 DIAGNOSIS — E55.9 VITAMIN D DEFICIENCY: ICD-10-CM

## 2021-06-24 DIAGNOSIS — Z86.2 HISTORY OF IRON DEFICIENCY ANEMIA: ICD-10-CM

## 2021-06-24 DIAGNOSIS — Z13.220 SCREENING CHOLESTEROL LEVEL: ICD-10-CM

## 2021-06-24 LAB
25(OH)D3 SERPL-MCNC: 48.8 NG/ML (ref 30–100)
ALBUMIN SERPL-MCNC: 4 G/DL (ref 3.5–5)
ALBUMIN/GLOB SERPL: 1.4 {RATIO} (ref 1.1–2.2)
ALP SERPL-CCNC: 69 U/L (ref 45–117)
ALT SERPL-CCNC: 56 U/L (ref 12–78)
ANION GAP SERPL CALC-SCNC: 3 MMOL/L (ref 5–15)
AST SERPL-CCNC: 29 U/L (ref 15–37)
BILIRUB SERPL-MCNC: 0.4 MG/DL (ref 0.2–1)
BUN SERPL-MCNC: 15 MG/DL (ref 6–20)
BUN/CREAT SERPL: 21 (ref 12–20)
CALCIUM SERPL-MCNC: 9 MG/DL (ref 8.5–10.1)
CHLORIDE SERPL-SCNC: 109 MMOL/L (ref 97–108)
CHOLEST SERPL-MCNC: 191 MG/DL
CO2 SERPL-SCNC: 31 MMOL/L (ref 21–32)
CREAT SERPL-MCNC: 0.73 MG/DL (ref 0.55–1.02)
ERYTHROCYTE [DISTWIDTH] IN BLOOD BY AUTOMATED COUNT: 13.1 % (ref 11.5–14.5)
GLOBULIN SER CALC-MCNC: 2.8 G/DL (ref 2–4)
GLUCOSE SERPL-MCNC: 79 MG/DL (ref 65–100)
HCT VFR BLD AUTO: 38.5 % (ref 35–47)
HDLC SERPL-MCNC: 79 MG/DL
HDLC SERPL: 2.4 {RATIO} (ref 0–5)
HGB BLD-MCNC: 12.3 G/DL (ref 11.5–16)
LDLC SERPL CALC-MCNC: 101.2 MG/DL (ref 0–100)
MCH RBC QN AUTO: 29.6 PG (ref 26–34)
MCHC RBC AUTO-ENTMCNC: 31.9 G/DL (ref 30–36.5)
MCV RBC AUTO: 92.5 FL (ref 80–99)
NRBC # BLD: 0 K/UL (ref 0–0.01)
NRBC BLD-RTO: 0 PER 100 WBC
PLATELET # BLD AUTO: 196 K/UL (ref 150–400)
PMV BLD AUTO: 10.1 FL (ref 8.9–12.9)
POTASSIUM SERPL-SCNC: 5.1 MMOL/L (ref 3.5–5.1)
PROT SERPL-MCNC: 6.8 G/DL (ref 6.4–8.2)
RBC # BLD AUTO: 4.16 M/UL (ref 3.8–5.2)
SODIUM SERPL-SCNC: 143 MMOL/L (ref 136–145)
TRIGL SERPL-MCNC: 54 MG/DL (ref ?–150)
TSH SERPL DL<=0.05 MIU/L-ACNC: 2.09 UIU/ML (ref 0.36–3.74)
VLDLC SERPL CALC-MCNC: 10.8 MG/DL
WBC # BLD AUTO: 4.3 K/UL (ref 3.6–11)

## 2021-06-24 PROCEDURE — 99213 OFFICE O/P EST LOW 20 MIN: CPT | Performed by: FAMILY MEDICINE

## 2021-06-24 RX ORDER — BISMUTH SUBSALICYLATE 262 MG
1 TABLET,CHEWABLE ORAL DAILY
COMMUNITY

## 2021-06-24 NOTE — PROGRESS NOTES
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 The sharp pains occur 2-3 times back to back the one time then goes away and doesn't occur anymore for possibly a few more months down the road. She estimates a total of about 5 episodes over the past year. But she recalls this happening a few times randomly years ago as well. There is no pattern and she has no other associated eye or other complaints at all. Last occurrence was about 2 weeks ago. She does wear eyeglasses but not contact lenses. Has not seen her eye doctor for 2 yrs or more. Denies: eye redness, tearing, discharge, swelling, visual loss, visual distortion, blurred vision, flashing lights, floaters, photophobia, headaches, dizziness, sinus symptoms, ear pain, weakness, paresthesias. She exercises regularly and has no exertional symptoms. REVIEW OF SYMPTOMS   Review of Systems   Constitutional: Negative. Negative for chills, fever, malaise/fatigue and weight loss. HENT: Negative. Eyes: Negative for blurred vision, double vision, discharge and redness. Respiratory: Negative. Cardiovascular: Negative. Gastrointestinal: Negative. Genitourinary: Negative. Musculoskeletal: Negative. Skin: Negative. Neurological: Negative. Endo/Heme/Allergies: Negative.             PROBLEM LIST/MEDICAL HISTORY     Problem List  Date Reviewed: 6/24/2021        Codes Class Noted    Environmental and seasonal allergies ICD-10-CM: J30.89  ICD-9-CM: 477.8  7/8/2016        Iron deficiency anemia ICD-10-CM: D50.9  ICD-9-CM: 280.9  5/2/2016    Overview Signed 5/2/2016  2:04 PM by Sola Stanton MD     Mild               Screening for cardiovascular condition ICD-10-CM: Z13.6  ICD-9-CM: V81.2  3/27/2015    Overview Addendum 11/8/2018 3:00 PM by MD Dr Comfort Schrader [facility] 6-1101 through 3-2015: 2D Echo, 24 Hour Holter Monitor, Cardiac MRI, ETT stress test, appts annually             Family history of sudden cardiac death (SCD) ICD-10-CM: Z82.41  ICD-9-CM: V17.41  3/27/2015        Family history of cardiac arrhythmia ICD-10-CM: Z82.49  ICD-9-CM: V17.49  3/27/2015        Vitamin D deficiency ICD-10-CM: E55.9  ICD-9-CM: 268.9  2011    Overview Signed 2011 12:17 PM by Michael Pope MD     2010             Seasonal allergic rhinitis ICD-10-CM: J30.2  ICD-9-CM: 477.9  2010    Overview Signed 2011 12:17 PM by Michael Pope MD     Spring/fall             Eczema ICD-10-CM: L30.9  ICD-9-CM: 692.9  2010                  PAST SURGICAL HISTORY     Past Surgical History:   Procedure Laterality Date    COLONOSCOPY N/A 10/23/2020    COLONOSCOPY performed by Arm[facility]ard MD at [facility] ENDOSCOPY    HX BREAST BIOPSY Right     Surgical Bx - Benign     HX  SECTION  3/2008, 2006    HX HYSTERECTOMY  2016    HX MYOMECTOMY  2002    HX TONSIL AND ADENOIDECTOMY  1984    age 17    HX TONSIL AND ADENOIDECTOMY      HX TOTAL LAPAROSCOPIC HYSTERECTOMY  2016    Uterine Fibroids, Dr Cheko Rice    [facility] US BX BREAST RT 1ST LESION W/CLIP AND SPECIMEN Right 2006    Benign         MEDICATIONS     Current Outpatient Medications   Medication Sig    multivitamin (ONE A DAY) tablet Take 1 Tablet by mouth daily.  cholecalciferol, vitamin D3, (VITAMIN D3 PO) Take 800 Units by mouth daily.  cetirizine (ZYRTEC) 10 mg tablet Take 10 mg by mouth daily as needed. No current facility-administered medications for this visit.           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    Diet: follows sensible, balanced diet    Exercise: a few x a week    Caffeine: 2-3 servings of coffee a day    Weight: stable over the years         Social History     Substance and Sexual Activity   Sexual Activity Yes    Partners: Male    Comment: Serene Butter BCP May 2013;  had Vasectomy       IMMUNIZATIONS     Immunization History   Administered Date(s) Administered    COVID-19, PFIZER, MRNA, LNP-S, PF, 30MCG/0.3ML DOSE 12/23/2020, 01/13/2021    Influenza Vaccine 09/01/2018    Influenza Vaccine (Quad) PF (>6 Mo Flulaval, Fluarix, and >3 Yrs Afluria, Fluzone 58053) 08/28/2020    TDAP Vaccine 02/25/2009         FAMILY HISTORY     Family History   Problem Relation Age of Onset    Hypertension Mother     Thyroid Disease Mother 79        Hypothyroidism    Other Mother         PVD    Hypertension Father     Heart Disease Father         A. Fib    Parkinson's Disease Father     Arrhythmia Father         AFIB    Asthma Son     Diabetes Maternal Grandmother     Heart Disease Maternal Grandmother     Heart Disease Maternal Grandfather     Heart Disease Paternal Grandmother [de-identified]    Heart Disease Brother 25        sudden death, cardiac arrhythmia    Heart Disease Brother         A. Fib.  Arrhythmia Brother         AFIB    Heart Attack Sister 47        sudden MI, successfully resuscitate, defibrillator in place    Anesth Problems Neg Hx          VITALS     Visit Vitals  /84 (BP 1 Location: Left upper arm, BP Patient Position: Sitting, BP Cuff Size: Adult)   Pulse 60   Temp 98 °F (36.7 °C) (Temporal)   Resp 18   Ht 5' 5\" (1.651 m)   Wt 143 lb 9.6 oz (65.1 kg)   LMP 11/16/2016 (Exact Date)   SpO2 98%   BMI 23.90 kg/m²          PHYSICAL EXAMINATION   Physical Exam  Vitals reviewed.    Constitutional:       General: She is not in acute distress. Appearance: Normal appearance. She is not ill-appearing. HENT:      Head: Normocephalic. Jaw: No tenderness. Comments: No facial tenderness     Right Ear: Tympanic membrane normal.      Left Ear: Tympanic membrane normal.      Nose:      Right Sinus: No maxillary sinus tenderness or frontal sinus tenderness. Left Sinus: No maxillary sinus tenderness or frontal sinus tenderness. Eyes:      General: Lids are normal. Vision grossly intact. Extraocular Movements: Extraocular movements intact. Conjunctiva/sclera: Conjunctivae normal.      Right eye: Right conjunctiva is not injected. No exudate or hemorrhage. Left eye: Left conjunctiva is not injected. No exudate or hemorrhage. Pupils: Pupils are equal, round, and reactive to light. Funduscopic exam:     Right eye: No exudate or papilledema. Left eye: No exudate or papilledema. Neck:      Thyroid: No thyroid mass, thyromegaly or thyroid tenderness. Vascular: No carotid bruit. Cardiovascular:      Rate and Rhythm: Normal rate and regular rhythm. Heart sounds: Normal heart sounds. No murmur heard. Pulmonary:      Effort: Pulmonary effort is normal.      Breath sounds: Normal breath sounds. Musculoskeletal:         General: Normal range of motion. Cervical back: Neck supple. No tenderness. Lymphadenopathy:      Cervical: No cervical adenopathy. Neurological:      General: No focal deficit present. Mental Status: She is alert and oriented to person, place, and time. Mental status is at baseline. Cranial Nerves: Cranial nerves are intact. No cranial nerve deficit. Motor: Motor function is intact. Coordination: Coordination is intact.       Gait: Gait normal.      Comments: Normal cerebellar exam.    Psychiatric:         Mood and Affect: Mood normal.                LABORATORY DATA/ANCILLARY/IMAGING     Results for orders placed or performed in visit on 06/24/21 VITAMIN D, 25 HYDROXY   Result Value Ref Range    Vitamin D 25-Hydroxy 48.8 30 - 100 ng/mL   TSH 3RD GENERATION   Result Value Ref Range    TSH 2.09 0.36 - 3.74 uIU/mL   LIPID PANEL   Result Value Ref Range    Cholesterol, total 191 <200 MG/DL    Triglyceride 54 <150 MG/DL    HDL Cholesterol 79 MG/DL    LDL, calculated 101.2 (H) 0 - 100 MG/DL    VLDL, calculated 10.8 MG/DL    CHOL/HDL Ratio 2.4 0.0 - 5.0     METABOLIC PANEL, COMPREHENSIVE   Result Value Ref Range    Sodium 143 136 - 145 mmol/L    Potassium 5.1 3.5 - 5.1 mmol/L    Chloride 109 (H) 97 - 108 mmol/L    CO2 31 21 - 32 mmol/L    Anion gap 3 (L) 5 - 15 mmol/L    Glucose 79 65 - 100 mg/dL    BUN 15 6 - 20 MG/DL    Creatinine 0.73 0.55 - 1.02 MG/DL    BUN/Creatinine ratio 21 (H) 12 - 20      GFR est AA >60 >60 ml/min/1.73m2    GFR est non-AA >60 >60 ml/min/1.73m2    Calcium 9.0 8.5 - 10.1 MG/DL    Bilirubin, total 0.4 0.2 - 1.0 MG/DL    ALT (SGPT) 56 12 - 78 U/L    AST (SGOT) 29 15 - 37 U/L    Alk. phosphatase 69 45 - 117 U/L    Protein, total 6.8 6.4 - 8.2 g/dL    Albumin 4.0 3.5 - 5.0 g/dL    Globulin 2.8 2.0 - 4.0 g/dL    A-G Ratio 1.4 1.1 - 2.2     CBC W/O DIFF   Result Value Ref Range    WBC 4.3 3.6 - 11.0 K/uL    RBC 4.16 3.80 - 5.20 M/uL    HGB 12.3 11.5 - 16.0 g/dL    HCT 38.5 35.0 - 47.0 %    MCV 92.5 80.0 - 99.0 FL    MCH 29.6 26.0 - 34.0 PG    MCHC 31.9 30.0 - 36.5 g/dL    RDW 13.1 11.5 - 14.5 %    PLATELET 956 778 - 158 K/uL    MPV 10.1 8.9 - 12.9 FL    NRBC 0.0 0  WBC    ABSOLUTE NRBC 0.00 0.00 - 0.01 K/uL        ASSESSMENT & PLAN   Diagnoses and all orders for this visit:    1. Left eye pain  -     METABOLIC PANEL, COMPREHENSIVE; Future  -     LIPID PANEL; Future  -     TSH 3RD GENERATION; Future  -     REFERRAL TO OPHTHALMOLOGY    2. Vitamin D deficiency  -     VITAMIN D, 25 HYDROXY; Future    3. History of iron deficiency anemia  -     CBC W/O DIFF; Future    4. Screening cholesterol level  -     LIPID PANEL;  Future

## 2021-06-24 NOTE — PROGRESS NOTES
Chief Complaint   Patient presents with    Eye Pain     Sharp pain behind left eye     1. Have you been to the ER, urgent care clinic since your last visit? Hospitalized since your last visit? No    2. Have you seen or consulted any other health care providers outside of the [address] [place] [address] since your last visit? Include any pap smears or colon screening.  No

## 2021-07-29 ENCOUNTER — HOSPITAL ENCOUNTER (OUTPATIENT)
Dept: MAMMOGRAPHY | Age: 51
Discharge: HOME | End: 2021-07-29
Attending: OBSTETRICS & GYNECOLOGY
Payer: COMMERCIAL

## 2021-07-29 DIAGNOSIS — Z12.31 VISIT FOR SCREENING MAMMOGRAM: ICD-10-CM

## 2021-07-29 PROCEDURE — 77063 BREAST TOMOSYNTHESIS BI: CPT

## 2021-11-10 ENCOUNTER — OFFICE VISIT (OUTPATIENT)
Dept: FAMILY MEDICINE CLINIC | Age: 51
End: 2021-11-10
Payer: COMMERCIAL

## 2021-11-10 VITALS
SYSTOLIC BLOOD PRESSURE: 122 MMHG | OXYGEN SATURATION: 99 % | WEIGHT: 142.2 LBS | BODY MASS INDEX: 23.69 KG/M2 | RESPIRATION RATE: 16 BRPM | DIASTOLIC BLOOD PRESSURE: 74 MMHG | TEMPERATURE: 98.3 F | HEIGHT: 65 IN | HEART RATE: 70 BPM

## 2021-11-10 DIAGNOSIS — R73.09 ELEVATED HEMOGLOBIN A1C: ICD-10-CM

## 2021-11-10 DIAGNOSIS — Z00.00 ROUTINE GENERAL MEDICAL EXAMINATION AT A HEALTH CARE FACILITY: Primary | ICD-10-CM

## 2021-11-10 DIAGNOSIS — Z23 ENCOUNTER FOR IMMUNIZATION: ICD-10-CM

## 2021-11-10 LAB — HBA1C MFR BLD HPLC: 5.3 %

## 2021-11-10 PROCEDURE — 83036 HEMOGLOBIN GLYCOSYLATED A1C: CPT | Performed by: FAMILY MEDICINE

## 2021-11-10 PROCEDURE — 90471 IMMUNIZATION ADMIN: CPT | Performed by: FAMILY MEDICINE

## 2021-11-10 PROCEDURE — 99396 PREV VISIT EST AGE 40-64: CPT | Performed by: FAMILY MEDICINE

## 2021-11-10 PROCEDURE — 90715 TDAP VACCINE 7 YRS/> IM: CPT | Performed by: FAMILY MEDICINE

## 2021-11-10 RX ORDER — ESTRADIOL 0.05 MG/D
1 FILM, EXTENDED RELEASE TRANSDERMAL
COMMUNITY

## 2021-11-10 NOTE — PATIENT INSTRUCTIONS
Learning About Low-Carbohydrate Diets  What is a low-carbohydrate diet? A low-carbohydrate (or \"low-carb\") diet limits foods and drinks that have carbohydrates. This includes grains, fruits, milk and yogurt, and starchy vegetables like potatoes, beans, and corn. It also avoids foods and drinks that have added sugar. Instead, low-carb diets include foods that are high in protein and fat. Why might you follow a low-carb diet? Low-carb diets may be used for a variety of reasons, such as for weight loss. People who have diabetes may use a low-carb diet to help manage their blood sugar levels. What should you do before you start the diet? Talk to your doctor before you try any diet. This is even more important if you have health problems like kidney disease, heart disease, or diabetes. Your doctor may suggest that you meet with a registered dietitian. A dietitian can help you make an eating plan that works for you. What foods do you eat on a low-carb diet? On a low-carb diet, you choose foods that are high in protein and fat. Examples of these are:  · Meat, poultry, and fish. · Eggs. · Nuts, such as walnuts, pecans, almonds, and peanuts. · Peanut butter and other nut butters. · Tofu. · Avocado. · [place]. · Non-starchy vegetables like broccoli, cauliflower, green beans, mushrooms, peppers, lettuce, and spinach. · Unsweetened non-dairy milks like almond milk and coconut milk. · Cheese, cottage cheese, and cream cheese. Current as of: December 17, 2020               Content Version: 13.0  © 2006-2021 [employer]. Care instructions adapted under license by [employer] (which disclaims liability or warranty for this information). If you have questions about a medical condition or this instruction, always ask your healthcare professional. [place] [address] any warranty or liability for your use of this information.          Learning About Low-Carbohydrate Foods  What foods are low in carbohydrate? The foods you eat contain nutrients, such as vitamins and minerals. Carbohydrate is a nutrient. Your body needs the right amount to stay healthy and work as it should. You can use the list below to help you make choices about which foods to eat. Some foods that are lower in carbohydrate include:  Dairy and dairy alternatives  · Cheese  · Cottage cheese  · Cream cheese  · Nut milk (unsweetened)  · Soy milk (unsweetened)  · Yogurt (Greek, plain)  Fruits  · Avocado  · [employer] and other protein foods  · Almonds  · Beef  · Chicken  · Cod  · Eggs  · Halibut  · Peanut butter and other nut butters  · Pistachios  · Pork  · Pumpkin seeds  · Tofu  · Trout  · [place]  · [place])  · Walnuts  Vegetables  · Broccoli  · Carrots  · Cauliflower  · Green beans  · Mushrooms  · Peppers  · Salad greens  · Spinach  · Tomatoes  Work with your doctor to find out how much of this nutrient you need. Depending on your health, you may need more or less of it in your diet. Where can you learn more? Go to http://www.gray.com/  Enter C470 in the search box to learn more about \"Learning About Low-Carbohydrate Foods. \"  Current as of: December 17, 2020               Content Version: 13.0  © 2419-7888 [employer]. Care instructions adapted under license by [employer] (which disclaims liability or warranty for this information). If you have questions about a medical condition or this instruction, always ask your healthcare professional. [place] [address] any warranty or liability for your use of this information.

## 2021-11-10 NOTE — PROGRESS NOTES
Chief Complaint   Patient presents with    Complete Physical     fasting     1. \"Have you been to the ER, urgent care clinic since your last visit? Hospitalized since your last visit? \" No    2. \"Have you seen or consulted any other health care providers outside of the [address] [place] [address] since your last visit? \" Yes Where: eye exam Dr Lisa Lozano     3. For patients over 45: Has the patient had a colonoscopy? 2020 Dr Shields    If the patient is female:  4. For patients over 40: Has the patient had a mammogram? In system    5. For patients over 21: Has the patient had a pap smear?  Gyn Dr Broderick Stearns

## 2021-11-10 NOTE — PROGRESS NOTES
Chief Complaint   Patient presents with    Complete Physical     fasting lab panel done here 6-2021 and insurance physical 7-2021, A1c was mildly elevated 5.7       HISTORY OF PRESENT ILLNESS   HPI  Annual CPE  She had complete fasting lab panel at a routine visit w/ me here back in 6-2021. She also had fasting labs done for an insurance physical in July and was surprised to see her A1c was 5.7  Diet: follows sensible, balanced diet; admits she does love chips and occasional sweets but since elevated A1c at her insurance physical 7-2021 she has been cutting back some  Exercise: runs on treadmill or in neighborhood 4-5 x a week x 30-45 minutes, light wts, stretches  Caffeine: 2-3 servings of black coffee a day, occ tea, no sodas  Weight: stable over the years 140's  Overall has been getting along well and feeling well in general.  Sees gyn annually for well woman visits/gyn exams. Dr. Santiago Eller started her on HRT patch in 8-2021 for severe hot flashes. That is helping significantly. REVIEW OF SYMPTOMS   Review of Systems   Constitutional: Negative. HENT: Negative. Eyes: Negative. Respiratory: Negative. Cardiovascular: Negative. Gastrointestinal: Negative. Genitourinary: Negative. Musculoskeletal: Negative. Neurological: Negative. Endo/Heme/Allergies: Negative. Psychiatric/Behavioral: Negative.             PROBLEM LIST/MEDICAL HISTORY     Problem List  Date Reviewed: 11/10/2021          Codes Class Noted    Environmental and seasonal allergies ICD-10-CM: J30.89  ICD-9-CM: 477.8  7/8/2016        Iron deficiency anemia ICD-10-CM: D50.9  ICD-9-CM: 280.9  5/2/2016    Overview Signed 5/2/2016  2:04 PM by Edenilson Cavazos MD     Mild               Screening for cardiovascular condition ICD-10-CM: Z13.6  ICD-9-CM: V81.2  3/27/2015    Overview Addendum 11/10/2021  9:58 AM by Edenilson Cavazos MD     Strong family h/o cardiac arrhythmias; Consult Dr Roberto Ledbetter MCV [phone] through 3-: 2D Echo, 24 Hour Holter Monitor, Cardiac MRI, ETT stress test, appts annually through 2018, then released to follow up prn             Family history of sudden cardiac death (SCD) ICD-10-CM: Z82.41  ICD-9-CM: V17.41  3/27/2015        Family history of cardiac arrhythmia ICD-10-CM: Z82.49  ICD-9-CM: V17.49  3/27/2015        Vitamin D deficiency ICD-10-CM: E55.9  ICD-9-CM: 268.9  2011    Overview Signed 2011 12:17 PM by Pasquale Ochoa MD     2010             Seasonal allergic rhinitis ICD-10-CM: J30.2  ICD-9-CM: 477.9  2010    Overview Signed 2011 12:17 PM by Pasquale Ochoa MD     Spring/             Eczema ICD-10-CM: L30.9  ICD-9-CM: 692.9  2010                  PAST SURGICAL HISTORY     Past Surgical History:   Procedure Laterality Date    COLONOSCOPY N/A 10/23/2020    Normal, Repeat 10 yrs, COLONOSCOPY performed by Que Mirza MD at [facility] ENDOSCOPY    HX BREAST BIOPSY Right     Surgical Bx - Benign     HX  SECTION  3/2008, 2006    HX MYOMECTOMY  2002    HX TONSIL AND ADENOIDECTOMY  1984    age 17   Prudy Lean TONSIL AND ADENOIDECTOMY      HX TOTAL LAPAROSCOPIC HYSTERECTOMY  2016    Uterine Fibroids, Ovaries intact; Dr Dewayne Purvis [facility] [address] Right 2006    Benign         MEDICATIONS     Current Outpatient Medications   Medication Sig    estradioL (VIVELLE) 0.05 mg/24 hr 1 Patch by TransDERmal route. Twice weekly per Gyn since  for hot flashes, sweats    multivitamin (ONE A DAY) tablet Take 1 Tablet by mouth daily. Includes Vitamin D3    cetirizine (ZYRTEC) 10 mg tablet Take 10 mg by mouth daily as needed. No current facility-administered medications for this visit. ALLERGIES   No Known Allergies       SOCIAL HISTORY     Social History     Tobacco Use    Smoking status: Never Smoker    Smokeless tobacco: Never Used   Substance Use Topics    Alcohol use:  Yes Comment: very rare     Social History     Social History Narrative    , 2 sons (14 yo and 15 yo) who attend . 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        Diet: follows sensible, balanced diet; admits she does love chips and occasional sweets    Exercise: runs on treadmill or in neighborhood 4-5 x a week x 30-45 minutes, light wts, stretches    Caffeine: 2-3 servings of black coffee a day, occ tea, no sodas    Weight: stable over the years 140's         Social History     Substance and Sexual Activity   Sexual Activity Yes    Partners: Male    Birth control/protection: Surgical    Comment: Stopped Seasonique BCP May 2013;  had Vasectomy; Patient had Hysteretomy 2016       IMMUNIZATIONS     Immunization History   Administered Date(s) Administered    COVID-19, PFIZER, MRNA, LNP-S, PF, 30MCG/0.3ML DOSE 12/23/2020, 01/13/2021, 10/01/2021    Influenza Vaccine 09/01/2018, 10/01/2021    Influenza Vaccine (Quad) PF (>6 Mo Flulaval, Fluarix, and >3 Yrs Afluria, Fluzone [id number]) 08/28/2020    TDAP Vaccine 02/25/2009    Tdap 11/10/2021         FAMILY HISTORY     Family History   Problem Relation Age of Onset    Hypertension Mother     Thyroid Disease Mother 79        Hypothyroidism    Other Mother         PVD    Hypertension Father     Heart Disease Father         A. Fib    Parkinson's Disease Father     Arrhythmia Father         AFIB    Asthma Son     Diabetes Maternal Grandmother     Heart Disease Maternal Grandmother     Heart Disease Maternal Grandfather     Heart Disease Paternal Grandmother [de-identified]    Heart Disease Brother 25        sudden death, cardiac arrhythmia    Prostate Cancer Brother 61        treated    Heart Disease Brother         A. Fib.     Arrhythmia Brother         AFIB    Heart Attack Sister 47        sudden MI, successfully resuscitate, defibrillator in place    Anesth Problems Neg Hx          VITALS     Visit Vitals  /74 (BP 1 Location: Left upper arm, BP Patient Position: Sitting, BP Cuff Size: Adult)   Pulse 70   Temp 98.3 °F (36.8 °C) (Oral)   Resp 16   Ht 5' 5\" (1.651 m)   Wt 142 lb 3.2 oz (64.5 kg)   LMP 11/16/2016 (Exact Date) Comment: Hysterectomy-December, 2016   SpO2 99%   BMI 23.66 kg/m²          PHYSICAL EXAMINATION   Physical Exam  Vitals reviewed. Constitutional:       Appearance: Normal appearance. HENT:      Right Ear: Tympanic membrane normal.      Left Ear: Tympanic membrane normal.   Eyes:      Conjunctiva/sclera: Conjunctivae normal.   Neck:      Thyroid: No thyroid mass, thyromegaly or thyroid tenderness. Vascular: No carotid bruit. Cardiovascular:      Rate and Rhythm: Normal rate and regular rhythm. Heart sounds: Normal heart sounds. No murmur heard. No gallop. Pulmonary:      Effort: Pulmonary effort is normal.      Breath sounds: Normal breath sounds. Abdominal:      General: There is no distension. Palpations: Abdomen is soft. Tenderness: There is no abdominal tenderness. Musculoskeletal:         General: No swelling or tenderness. Normal range of motion. Cervical back: Neck supple. Right lower leg: No edema. Left lower leg: No edema. Lymphadenopathy:      Cervical: No cervical adenopathy. Skin:     General: Skin is warm and dry. Neurological:      General: No focal deficit present. Mental Status: She is alert and oriented to person, place, and time. Mental status is at baseline.    Psychiatric:         Mood and Affect: Mood normal.         Behavior: Behavior normal.                LABORATORY DATA/ANCILLARY/IMAGING     Results for orders placed or performed in visit on 06/24/21   VITAMIN D, 25 HYDROXY   Result Value Ref Range    Vitamin D 25-Hydroxy 48.8 30 - 100 ng/mL   TSH 3RD GENERATION   Result Value Ref Range    TSH 2.09 0.36 - 3.74 uIU/mL   LIPID PANEL   Result Value Ref Range    Cholesterol, total 191 <200 MG/DL    Triglyceride 54 <150 MG/DL    HDL Cholesterol 79 MG/DL    LDL, calculated 101.2 (H) 0 - 100 MG/DL    VLDL, calculated 10.8 MG/DL    CHOL/HDL Ratio 2.4 0.0 - 5.0     METABOLIC PANEL, COMPREHENSIVE   Result Value Ref Range    Sodium 143 136 - 145 mmol/L    Potassium 5.1 3.5 - 5.1 mmol/L    Chloride 109 (H) 97 - 108 mmol/L    CO2 31 21 - 32 mmol/L    Anion gap 3 (L) 5 - 15 mmol/L    Glucose 79 65 - 100 mg/dL    BUN 15 6 - 20 MG/DL    Creatinine 0.73 0.55 - 1.02 MG/DL    BUN/Creatinine ratio 21 (H) 12 - 20      GFR est AA >60 >60 ml/min/1.73m2    GFR est non-AA >60 >60 ml/min/1.73m2    Calcium 9.0 8.5 - 10.1 MG/DL    Bilirubin, total 0.4 0.2 - 1.0 MG/DL    ALT (SGPT) 56 12 - 78 U/L    AST (SGOT) 29 15 - 37 U/L    Alk. phosphatase 69 45 - 117 U/L    Protein, total 6.8 6.4 - 8.2 g/dL    Albumin 4.0 3.5 - 5.0 g/dL    Globulin 2.8 2.0 - 4.0 g/dL    A-G Ratio 1.4 1.1 - 2.2     CBC W/O DIFF   Result Value Ref Range    WBC 4.3 3.6 - 11.0 K/uL    RBC 4.16 3.80 - 5.20 M/uL    HGB 12.3 11.5 - 16.0 g/dL    HCT 38.5 35.0 - 47.0 %    MCV 92.5 80.0 - 99.0 FL    MCH 29.6 26.0 - 34.0 PG    MCHC 31.9 30.0 - 36.5 g/dL    RDW 13.1 11.5 - 14.5 %    PLATELET 041 361 - 749 K/uL    MPV 10.1 8.9 - 12.9 FL    NRBC 0.0 0  WBC    ABSOLUTE NRBC 0.00 0.00 - 0.01 K/uL           ASSESSMENT & PLAN       ICD-10-CM ICD-9-CM    1. Routine general medical examination at a health care facility  Z00.00 V70.0    2. Encounter for immunization  Z23 V03.89 TETANUS, DIPHTHERIA TOXOIDS AND ACELLULAR PERTUSSIS VACCINE (TDAP), IN INDIVIDS. >=7, IM      VT IMMUNIZ ADMIN,1 SINGLE/COMB VAC/TOXOID   3. Mildly Elevated Hemoglobin A1c  R73.09 790.29 AMB POC HEMOGLOBIN A1C: 5.3     Recent complete fasting lab panels from 6-2021 and also the results of tests done at her insurance physical 7-2021 all reviewed w/ pt today.   Cardiovascular risk and specific lipid/LDL/HDL/HgbA1c goals reviewed  Reviewed diet, nutrition, exercise, weight management, BMI/goals. Age/risk based screening recommendations, health maintenance & prevention counseling. Cancer screening USPTFS guidelines reviewed w/ pt today. Discussed benefits/positive/negative outcomes of screening based on age/risk stratification. Informed consent for/against screening based on pt's personal hx/risk factors. Updated in history above and health maintenance. Sees gyn Dr. Nathalie Peasron annually for well woman visits/gyn exams and paps per guidelines. Pap reportedly normal 3-2021. Mammogram done at [facility] 7-2021 negative. Colonoscopy up to date  x 10 yrs. RTC 1 yr for annual fasting CPE. Follow up sooner prn.

## 2022-03-19 PROBLEM — R73.09 ELEVATED HEMOGLOBIN A1C: Status: ACTIVE | Noted: 2021-11-10

## 2022-05-12 ENCOUNTER — APPOINTMENT (OUTPATIENT)
Dept: CT IMAGING | Age: 52
End: 2022-05-12
Attending: NURSE PRACTITIONER
Payer: COMMERCIAL

## 2022-05-12 ENCOUNTER — HOSPITAL ENCOUNTER (EMERGENCY)
Age: 52
Discharge: HOME | End: 2022-05-13
Attending: EMERGENCY MEDICINE
Payer: COMMERCIAL

## 2022-05-12 VITALS
DIASTOLIC BLOOD PRESSURE: 97 MMHG | RESPIRATION RATE: 16 BRPM | HEART RATE: 61 BPM | TEMPERATURE: 97.5 F | OXYGEN SATURATION: 99 % | SYSTOLIC BLOOD PRESSURE: 145 MMHG

## 2022-05-12 DIAGNOSIS — R22.0 FACIAL SWELLING: ICD-10-CM

## 2022-05-12 DIAGNOSIS — L03.211 CELLULITIS OF CHIN: Primary | ICD-10-CM

## 2022-05-12 LAB
ALBUMIN SERPL-MCNC: 3.8 G/DL (ref 3.5–5)
ALBUMIN/GLOB SERPL: 1 {RATIO} (ref 1.1–2.2)
ALP SERPL-CCNC: 64 U/L (ref 45–117)
ALT SERPL-CCNC: 28 U/L (ref 12–78)
ANION GAP SERPL CALC-SCNC: 3 MMOL/L (ref 5–15)
AST SERPL-CCNC: 20 U/L (ref 15–37)
BASOPHILS # BLD: 0 K/UL (ref 0–0.1)
BASOPHILS NFR BLD: 0 % (ref 0–1)
BILIRUB SERPL-MCNC: 0.3 MG/DL (ref 0.2–1)
BUN SERPL-MCNC: 10 MG/DL (ref 6–20)
BUN/CREAT SERPL: 14 (ref 12–20)
CALCIUM SERPL-MCNC: 8.8 MG/DL (ref 8.5–10.1)
CHLORIDE SERPL-SCNC: 108 MMOL/L (ref 97–108)
CO2 SERPL-SCNC: 28 MMOL/L (ref 21–32)
CREAT SERPL-MCNC: 0.73 MG/DL (ref 0.55–1.02)
CRP SERPL-MCNC: 0.34 MG/DL (ref 0–0.6)
DIFFERENTIAL METHOD BLD: NORMAL
EOSINOPHIL # BLD: 0.1 K/UL (ref 0–0.4)
EOSINOPHIL NFR BLD: 2 % (ref 0–7)
ERYTHROCYTE [DISTWIDTH] IN BLOOD BY AUTOMATED COUNT: 12.7 % (ref 11.5–14.5)
ERYTHROCYTE [SEDIMENTATION RATE] IN BLOOD: 20 MM/HR (ref 0–30)
GLOBULIN SER CALC-MCNC: 3.7 G/DL (ref 2–4)
GLUCOSE SERPL-MCNC: 90 MG/DL (ref 65–100)
HCT VFR BLD AUTO: 38.5 % (ref 35–47)
HGB BLD-MCNC: 13 G/DL (ref 11.5–16)
IMM GRANULOCYTES # BLD AUTO: 0 K/UL (ref 0–0.04)
IMM GRANULOCYTES NFR BLD AUTO: 0 % (ref 0–0.5)
LYMPHOCYTES # BLD: 2.5 K/UL (ref 0.8–3.5)
LYMPHOCYTES NFR BLD: 34 % (ref 12–49)
MCH RBC QN AUTO: 30.6 PG (ref 26–34)
MCHC RBC AUTO-ENTMCNC: 33.8 G/DL (ref 30–36.5)
MCV RBC AUTO: 90.6 FL (ref 80–99)
MONOCYTES # BLD: 0.7 K/UL (ref 0–1)
MONOCYTES NFR BLD: 10 % (ref 5–13)
NEUTS SEG # BLD: 3.9 K/UL (ref 1.8–8)
NEUTS SEG NFR BLD: 54 % (ref 32–75)
NRBC # BLD: 0 K/UL (ref 0–0.01)
NRBC BLD-RTO: 0 PER 100 WBC
PLATELET # BLD AUTO: 224 K/UL (ref 150–400)
PMV BLD AUTO: 9.7 FL (ref 8.9–12.9)
POTASSIUM SERPL-SCNC: 4 MMOL/L (ref 3.5–5.1)
PROCALCITONIN SERPL-MCNC: <0.05 NG/ML
PROT SERPL-MCNC: 7.5 G/DL (ref 6.4–8.2)
RBC # BLD AUTO: 4.25 M/UL (ref 3.8–5.2)
SODIUM SERPL-SCNC: 139 MMOL/L (ref 136–145)
WBC # BLD AUTO: 7.3 K/UL (ref 3.6–11)

## 2022-05-12 PROCEDURE — 86140 C-REACTIVE PROTEIN: CPT

## 2022-05-12 PROCEDURE — 74011000636 HC RX REV CODE- 636: Performed by: INTERNAL MEDICINE

## 2022-05-12 PROCEDURE — 99285 EMERGENCY DEPT VISIT HI MDM: CPT

## 2022-05-12 PROCEDURE — 85025 COMPLETE CBC W/AUTO DIFF WBC: CPT

## 2022-05-12 PROCEDURE — 84145 PROCALCITONIN (PCT): CPT

## 2022-05-12 PROCEDURE — 80053 COMPREHEN METABOLIC PANEL: CPT

## 2022-05-12 PROCEDURE — 36415 COLL VENOUS BLD VENIPUNCTURE: CPT

## 2022-05-12 PROCEDURE — 85652 RBC SED RATE AUTOMATED: CPT

## 2022-05-12 PROCEDURE — 96374 THER/PROPH/DIAG INJ IV PUSH: CPT

## 2022-05-12 PROCEDURE — 96375 TX/PRO/DX INJ NEW DRUG ADDON: CPT

## 2022-05-12 PROCEDURE — 70487 CT MAXILLOFACIAL W/DYE: CPT

## 2022-05-12 RX ORDER — KETOROLAC TROMETHAMINE 30 MG/ML
30 INJECTION, SOLUTION INTRAMUSCULAR; INTRAVENOUS
Status: COMPLETED | OUTPATIENT
Start: 2022-05-12 | End: 2022-05-13

## 2022-05-12 RX ORDER — HYDROCODONE BITARTRATE AND ACETAMINOPHEN 5; 325 MG/1; MG/1
1 TABLET ORAL
Status: DISCONTINUED | OUTPATIENT
Start: 2022-05-12 | End: 2022-05-13 | Stop reason: HOSPADM

## 2022-05-12 RX ADMIN — IOPAMIDOL 100 ML: 755 INJECTION, SOLUTION INTRAVENOUS at 23:58

## 2022-05-13 PROCEDURE — 96374 THER/PROPH/DIAG INJ IV PUSH: CPT

## 2022-05-13 PROCEDURE — 74011000250 HC RX REV CODE- 250: Performed by: NURSE PRACTITIONER

## 2022-05-13 PROCEDURE — 96375 TX/PRO/DX INJ NEW DRUG ADDON: CPT

## 2022-05-13 PROCEDURE — 74011250636 HC RX REV CODE- 250/636: Performed by: NURSE PRACTITIONER

## 2022-05-13 RX ORDER — METHYLPREDNISOLONE 4 MG/1
TABLET ORAL
Qty: 1 DOSE PACK | Refills: 0 | Status: SHIPPED | OUTPATIENT
Start: 2022-05-13

## 2022-05-13 RX ORDER — CEPHALEXIN 500 MG/1
500 CAPSULE ORAL 4 TIMES DAILY
Qty: 28 CAPSULE | Refills: 0 | Status: SHIPPED | OUTPATIENT
Start: 2022-05-13 | End: 2022-05-20

## 2022-05-13 RX ADMIN — CEFTRIAXONE SODIUM 1 G: 1 INJECTION, POWDER, FOR SOLUTION INTRAMUSCULAR; INTRAVENOUS at 01:18

## 2022-05-13 RX ADMIN — KETOROLAC TROMETHAMINE 30 MG: 30 INJECTION, SOLUTION INTRAMUSCULAR at 00:17

## 2022-05-13 NOTE — DISCHARGE INSTRUCTIONS
Apply warm compresses to chin, take antibiotics, Motrin, and pain medication as directed   Follow up with ENT, call tomorrow to set up a follow up appointment   Return to the ER for any worsening or worrisome symptoms

## 2022-05-13 NOTE — ED TRIAGE NOTES
Pt reports she started having centered chin pain 3 weeks ago. Pt reports the pain has increased and has spread to her jaw. Pt reports she saw an ENT and a dentists, but wasn't diagnosed with anything.

## 2022-05-13 NOTE — ED PROVIDER NOTES
HPI     Abdirahman Erickson is a 50 y.o. female with Hx of eczema, uterine fibroids, seasonal allergies who presents ambulatory by herself to [facility] ED with cc of chin pain/ swelling. Patient states that she has had progressively worsening pain with associated swelling to her chin over several weeks. Patient states that the pain is now radiating into her mandible and causing her her severe pain. She is taken 800 mg of Motrin with no relief. Patient states that she initially went to go see her dentist for this concern and they were unable to provide any clear diagnoses. Patient denies any recent dental pain, oral procedures or surgeries. She states that she had an appointment with [facility] ENT yesterday. ENT ordered a CT neck without contrast, which has not happened yet. Patient was given a prescription for Percocet to help aid with pain management as Motrin is not as effective. She states the Percocet effectiveness only lasted for approximately 3 hours, then the pain returned. Patient denies any trauma, falls, injuries, denies any salivary changes. She has had no wounds to her chin. She states that she is also had some associative lymph node swelling. Denies F/C, N/V/D, cough, congestion, CP, SOB, urinary or bowel habit concerns. Denies tobacco, alcohol, substance abuse. 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 PCP: Billy Porter MD    There are no other complaints, changes or physical findings at this time.         Past Medical History:   Diagnosis Date    Contact dermatitis and other eczema, due to unspecified cause     Elevated hemoglobin A1c 11/10/2021    Insurance Physical 7-2021: HgbA1c 5.7; Repeated PCP : 5.3    Environmental and seasonal allergies 7/8/2016    Family history of cardiac arrhythmia 3/27/2015    Family history of sudden cardiac death (SCD) 3/27/2015    Hx of mammogram 01/10/2018    Dr Andrea Ochoa mammographic evidence of malignancy-repeat in one year    Iron deficiency anemia 2016    Mild     Menopause     [id number]    Menorrhagia due to fibroids 2016    Gyn Dr Elva Delgadillo for cardiovascular condition 3/27/2015    Dr Jayesh Rice [facility]  through 3-2015: 2D Echo, 24 Hour Holter Monitor, Cardiac MRI, ETT stress test    Uterine Fibroids 2016    Multiple; s/p myomectomy , then recurred    Vitamin D deficiency 2011       Past Surgical History:   Procedure Laterality Date    COLONOSCOPY N/A 10/23/2020    Normal, Repeat 10 yrs, COLONOSCOPY performed by Jc Smith [facility]COPY    HX BREAST BIOPSY Right     Surgical Bx - Benign     HX  SECTION  3/2008, 2006    HX MYOMECTOMY  2002    HX TONSIL AND ADENOIDECTOMY  1984    age 17   [de-identified] TONSIL AND ADENOIDECTOMY      HX TOTAL LAPAROSCOPIC HYSTERECTOMY  2016    Uterine Fibroids, Ovaries intact; Dr Candice Olivo [facility] BX BREAST RT 1ST LESION W/CLIP AND SPECIMEN Right 2006    Benign         Family History:   Problem Relation Age of Onset    Hypertension Mother     Thyroid Disease Mother 79        Hypothyroidism    Other Mother         PVD    Hypertension Father     Heart Disease Father         A. Fib    Parkinson's Disease Father     Arrhythmia Father         AFIB    Asthma Son     Diabetes Maternal Grandmother     Heart Disease Maternal Grandmother     Heart Disease Maternal Grandfather     Heart Disease Paternal Grandmother [de-identified]    Heart Disease Brother 25        sudden death, cardiac arrhythmia    Prostate Cancer Brother 61        treated    Heart Disease Brother         A. Fib.     Arrhythmia Brother         AFIB    Heart Attack Sister 47        sudden MI, successfully resuscitate, defibrillator in place    Anesth Problems Neg Hx        Social History     Socioeconomic History    Marital status:      Spouse name: Not on file    Number of children: 2    Years of education: Not on file    Highest education level: Not on file   Occupational History    Occupation: Dermatologist   Tobacco Use    Smoking status: Never Smoker    Smokeless tobacco: Never Used   Vaping Use    Vaping Use: Never used   Substance and Sexual Activity    Alcohol use: Yes     Comment: very rare    Drug use: No    Sexual activity: Yes     Partners: Male     Birth control/protection: Surgical     Comment: Stopped Seasonique BCP May 2013;  had Vasectomy;  Patient had Hysteretomy 2016   Other Topics Concern     Service Not Asked    Blood Transfusions Not Asked    Caffeine Concern No     Comment: 1-2 cups of coffee a day    Occupational Exposure Not Asked    Hobby Hazards Not Asked    Sleep Concern Not Asked    Stress Concern Not Asked    Weight Concern No    Special Diet No    Back Care Not Asked    Exercise No     Comment: runs sporadically, maybe once every 1-2 weeks, used to run 2-3 x a week    Bike Helmet Not Asked    Seat Belt Not Asked    Self-Exams Not Asked   Social History Narrative    , 2 sons (14 yo and 15 yo) who attend [employer]    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[employer], [address]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        Diet: follows sensible, balanced diet; admits she does love chips and occasional sweets but since elevated A1c at her insurance physical 7-2021 she has been cutting back some    Exercise: runs on treadmill or in neighborhood 4-5 x a week x 30-45 minutes, light wts, stretches    Caffeine: 2-3 servings of black coffee a day, occ tea, no sodas    Weight: stable over the years 140's         Social Determinants of Health     Financial Resource Strain:     Difficulty of Paying Living Expenses: Not on file   Food Insecurity:     Worried About Running Out of Food in the Last Year: Not on file    Caleb of Food in the Last Year: Not on file   Transportation Needs:     Lack of Transportation (Medical): Not on file    Lack of Transportation (Non-Medical): Not on file   Physical Activity:     Days of Exercise per Week: Not on file    Minutes of Exercise per Session: Not on file   Stress:     Feeling of Stress : Not on file   Social Connections:     Frequency of Communication with Friends and Family: Not on file    Frequency of Social Gatherings with Friends and Family: Not on file    Attends [employer] Services: Not on file    Active Member of [address] [place] [address] or Organizations: Not on file    Attends Club or Organization Meetings: Not on file    Marital Status: Not on file   Intimate Partner Violence:     Fear of Current or Ex-Partner: Not on file    Emotionally Abused: Not on file    Physically Abused: Not on file    Sexually Abused: Not on file   Housing Stability:     Unable to Pay for Housing in the Last Year: Not on file    Number of [place] in the Last Year: Not on file    Unstable Housing in the Last Year: Not on file         ALLERGIES: Patient has no known allergies. Review of Systems   Constitutional: Negative for activity change, appetite change, chills and fever. HENT: Positive for facial swelling. Negative for congestion, dental problem, drooling, rhinorrhea and sore throat. Eyes: Negative for visual disturbance. Respiratory: Negative for cough and shortness of breath. Cardiovascular: Negative for chest pain. Gastrointestinal: Negative for abdominal pain, diarrhea, nausea and vomiting. Genitourinary: Negative for dysuria, flank pain, frequency and urgency. Musculoskeletal: Positive for myalgias. Negative for arthralgias, back pain and neck pain. Skin: Negative for color change and rash. Neurological: Negative for dizziness, weakness, light-headedness and headaches. Psychiatric/Behavioral: Negative for agitation, behavioral problems and confusion. All other systems reviewed and are negative.       Vitals:    05/12/22 2159   BP: (!) 145/97   Pulse: 61   Resp: 16   Temp: 97.5 °F (36.4 °C)   SpO2: 99%            Physical Exam  Vitals and nursing note reviewed. Constitutional:       General: She is not in acute distress. Appearance: She is well-developed. HENT:      Head: Normocephalic and atraumatic. Right Ear: External ear normal.      Left Ear: External ear normal.   Eyes:      Conjunctiva/sclera: Conjunctivae normal.      Pupils: Pupils are equal, round, and reactive to light. Cardiovascular:      Rate and Rhythm: Normal rate and regular rhythm. Heart sounds: Normal heart sounds. Pulmonary:      Effort: Pulmonary effort is normal.      Breath sounds: Normal breath sounds. Musculoskeletal:         General: Normal range of motion. Cervical back: Normal range of motion and neck supple. Skin:     General: Skin is warm and dry. Neurological:      Mental Status: She is alert and oriented to person, place, and time. Psychiatric:         Behavior: Behavior normal.         Thought Content: Thought content normal.         Judgment: Judgment normal.          MDM  Number of Diagnoses or Management Options  Cellulitis of chin  Facial swelling  Diagnosis management comments: DDx: abscess, mass, cellulitis     Patient presents to the emergency department with progressively worsening history of chin pain and swelling. All lab work is reassuring, normal white blood cell count and negative procalcitonin. CT has soft tissue swelling with concern for possible cellulitis. Patient was given a dose of Rocephin and placed on Keflex. She was encouraged to follow-up with ENT for ongoing management of this concern and to take pain medications as already prescribed and directed. Reasons to return to the ER were given.        Amount and/or Complexity of Data Reviewed  Clinical lab tests: ordered and reviewed  Tests in the radiology section of CPT®: ordered and reviewed           Procedures      LABORATORY TESTS:  Recent Results (from the past 12 hour(s))   CBC WITH AUTOMATED DIFF    Collection Time: 05/12/22 10:32 PM   Result Value Ref Range    WBC 7.3 3.6 - 11.0 K/uL    RBC 4.25 3.80 - 5.20 M/uL    HGB 13.0 11.5 - 16.0 g/dL    HCT 38.5 35.0 - 47.0 %    MCV 90.6 80.0 - 99.0 FL    MCH 30.6 26.0 - 34.0 PG    MCHC 33.8 30.0 - 36.5 g/dL    RDW 12.7 11.5 - 14.5 %    PLATELET 849 393 - 200 K/uL    MPV 9.7 8.9 - 12.9 FL    NRBC 0.0 0  WBC    ABSOLUTE NRBC 0.00 0.00 - 0.01 K/uL    NEUTROPHILS 54 32 - 75 %    LYMPHOCYTES 34 12 - 49 %    MONOCYTES 10 5 - 13 %    EOSINOPHILS 2 0 - 7 %    BASOPHILS 0 0 - 1 %    IMMATURE GRANULOCYTES 0 0.0 - 0.5 %    ABS. NEUTROPHILS 3.9 1.8 - 8.0 K/UL    ABS. LYMPHOCYTES 2.5 0.8 - 3.5 K/UL    ABS. MONOCYTES 0.7 0.0 - 1.0 K/UL    ABS. EOSINOPHILS 0.1 0.0 - 0.4 K/UL    ABS. BASOPHILS 0.0 0.0 - 0.1 K/UL    ABS. IMM. GRANS. 0.0 0.00 - 0.04 K/UL    DF AUTOMATED     METABOLIC PANEL, COMPREHENSIVE    Collection Time: 05/12/22 10:32 PM   Result Value Ref Range    Sodium 139 136 - 145 mmol/L    Potassium 4.0 3.5 - 5.1 mmol/L    Chloride 108 97 - 108 mmol/L    CO2 28 21 - 32 mmol/L    Anion gap 3 (L) 5 - 15 mmol/L    Glucose 90 65 - 100 mg/dL    BUN 10 6 - 20 MG/DL    Creatinine 0.73 0.55 - 1.02 MG/DL    BUN/Creatinine ratio 14 12 - 20      GFR est AA >60 >60 ml/min/1.73m2    GFR est non-AA >60 >60 ml/min/1.73m2    Calcium 8.8 8.5 - 10.1 MG/DL    Bilirubin, total 0.3 0.2 - 1.0 MG/DL    ALT (SGPT) 28 12 - 78 U/L    AST (SGOT) 20 15 - 37 U/L    Alk.  phosphatase 64 45 - 117 U/L    Protein, total 7.5 6.4 - 8.2 g/dL    Albumin 3.8 3.5 - 5.0 g/dL    Globulin 3.7 2.0 - 4.0 g/dL    A-G Ratio 1.0 (L) 1.1 - 2.2     SED RATE (ESR)    Collection Time: 05/12/22 10:32 PM   Result Value Ref Range    Sed rate, automated 20 0 - 30 mm/hr   C REACTIVE PROTEIN, QT    Collection Time: 05/12/22 10:32 PM   Result Value Ref Range    C-Reactive protein 0.34 0.00 - 0.60 mg/dL   PROCALCITONIN    Collection Time: 05/12/22 10:32 PM   Result Value Ref Range    Procalcitonin <0.05 ng/mL IMAGING RESULTS:  CT MAXILLOFACIAL W CONT   Final Result   Subcutaneous fat stranding/edema in the midline premandibular/submental space,   likely representing cellulitis. No visualized abscess. MEDICATIONS GIVEN:  Medications   HYDROcodone-acetaminophen (NORCO) 5-325 mg per tablet 1 Tablet (1 Tablet Oral Refused 5/13/22 0125)   ketorolac (TORADOL) injection 30 mg (30 mg IntraVENous Given 5/13/22 0017)   iopamidoL (ISOVUE-370) 76 % injection 100 mL (100 mL IntraVENous Given 5/12/22 1674)   cefTRIAXone (ROCEPHIN) 1 g in 0.9% sodium chloride 10 mL IV syringe (1 g IntraVENous Given 5/13/22 0118)       IMPRESSION:  1. Cellulitis of chin    2. Facial swelling        PLAN:  1. Discharge Medication List as of 5/13/2022 12:53 AM      START taking these medications    Details   cephALEXin (Keflex) 500 mg capsule Take 1 Capsule by mouth four (4) times daily for 7 days. , Normal, Disp-28 Capsule, R-0         CONTINUE these medications which have NOT CHANGED    Details   multivitamin (ONE A DAY) tablet Take 1 Tablet by mouth daily. Includes Vitamin D3, Historical Med      estradioL (VIVELLE) 0.05 mg/24 hr 1 Patch by TransDERmal route. Twice weekly per Gyn since 8-2021 for hot flashes, sweats, Historical Med      cetirizine (ZYRTEC) 10 mg tablet Take 10 mg by mouth daily as needed., Historical Med           2. Follow-up Information     Follow up With Specialties Details Why Contact Info    [address] [place] [facility] [address] [facility] Emergency Medicine Go to  As needed, If symptoms worsen Gaurav Abreu [address] [facility]    Jey Lyn MD Otolaryngology Schedule an appointment as soon as possible for a visit   Saeid Crespo.  [address] [place] [address] Rd  [phone]          3.  Return to ED if worse

## 2022-07-25 ENCOUNTER — TRANSCRIBE ORDER (OUTPATIENT)
Dept: SCHEDULING | Age: 52
End: 2022-07-25

## 2022-07-25 DIAGNOSIS — Z12.31 SCREENING MAMMOGRAM FOR HIGH-RISK PATIENT: Primary | ICD-10-CM

## 2022-11-16 ENCOUNTER — HOSPITAL ENCOUNTER (OUTPATIENT)
Dept: MAMMOGRAPHY | Age: 52
Discharge: HOME | End: 2022-11-16
Attending: FAMILY MEDICINE
Payer: COMMERCIAL

## 2022-11-16 DIAGNOSIS — Z12.31 SCREENING MAMMOGRAM FOR HIGH-RISK PATIENT: ICD-10-CM

## 2022-11-16 PROCEDURE — 77063 BREAST TOMOSYNTHESIS BI: CPT

## 2023-11-27 ENCOUNTER — TRANSCRIBE ORDERS (OUTPATIENT)
Facility: HOSPITAL | Age: 53
End: 2023-11-27

## 2023-11-27 DIAGNOSIS — Z12.31 VISIT FOR SCREENING MAMMOGRAM: Primary | ICD-10-CM

## 2023-12-15 ENCOUNTER — HOSPITAL ENCOUNTER (OUTPATIENT)
Facility: HOSPITAL | Age: 53
Discharge: HOME | End: 2023-12-15
Attending: FAMILY MEDICINE
Payer: COMMERCIAL

## 2023-12-15 VITALS — HEIGHT: 66 IN | BODY MASS INDEX: 22.82 KG/M2 | WEIGHT: 142 LBS

## 2023-12-15 DIAGNOSIS — Z12.31 VISIT FOR SCREENING MAMMOGRAM: ICD-10-CM

## 2023-12-15 PROCEDURE — 77067 SCR MAMMO BI INCL CAD: CPT

## 2024-01-29 ENCOUNTER — OFFICE VISIT (OUTPATIENT)
Age: 54
End: 2024-01-29
Payer: COMMERCIAL

## 2024-01-29 VITALS
WEIGHT: 146.4 LBS | RESPIRATION RATE: 16 BRPM | SYSTOLIC BLOOD PRESSURE: 108 MMHG | OXYGEN SATURATION: 98 % | HEIGHT: 65 IN | DIASTOLIC BLOOD PRESSURE: 68 MMHG | TEMPERATURE: 97.5 F | BODY MASS INDEX: 24.39 KG/M2 | HEART RATE: 56 BPM

## 2024-01-29 DIAGNOSIS — E55.9 VITAMIN D DEFICIENCY: ICD-10-CM

## 2024-01-29 DIAGNOSIS — Z11.59 NEED FOR HEPATITIS C SCREENING TEST: ICD-10-CM

## 2024-01-29 DIAGNOSIS — R73.09 ELEVATED HEMOGLOBIN A1C: ICD-10-CM

## 2024-01-29 DIAGNOSIS — Z82.49 FAMILY HISTORY OF CARDIAC ARRHYTHMIA: ICD-10-CM

## 2024-01-29 DIAGNOSIS — Z00.00 ANNUAL PHYSICAL EXAM: Primary | ICD-10-CM

## 2024-01-29 LAB
25(OH)D3 SERPL-MCNC: 41 NG/ML (ref 30–100)
ALBUMIN SERPL-MCNC: 4.2 G/DL (ref 3.5–5)
ALBUMIN/GLOB SERPL: 1.4 (ref 1.1–2.2)
ALP SERPL-CCNC: 51 U/L (ref 45–117)
ALT SERPL-CCNC: 29 U/L (ref 12–78)
ANION GAP SERPL CALC-SCNC: 2 MMOL/L (ref 5–15)
AST SERPL-CCNC: 25 U/L (ref 15–37)
BILIRUB SERPL-MCNC: 0.5 MG/DL (ref 0.2–1)
BUN SERPL-MCNC: 16 MG/DL (ref 6–20)
BUN/CREAT SERPL: 23 (ref 12–20)
CALCIUM SERPL-MCNC: 9.1 MG/DL (ref 8.5–10.1)
CHLORIDE SERPL-SCNC: 108 MMOL/L (ref 97–108)
CHOLEST SERPL-MCNC: 204 MG/DL
CO2 SERPL-SCNC: 31 MMOL/L (ref 21–32)
CREAT SERPL-MCNC: 0.71 MG/DL (ref 0.55–1.02)
ERYTHROCYTE [DISTWIDTH] IN BLOOD BY AUTOMATED COUNT: 12.7 % (ref 11.5–14.5)
EST. AVERAGE GLUCOSE BLD GHB EST-MCNC: 123 MG/DL
GLOBULIN SER CALC-MCNC: 3 G/DL (ref 2–4)
GLUCOSE SERPL-MCNC: 92 MG/DL (ref 65–100)
HBA1C MFR BLD: 5.9 % (ref 4–5.6)
HCT VFR BLD AUTO: 37.3 % (ref 35–47)
HCV AB SER IA-ACNC: 0.23 INDEX
HCV AB SERPL QL IA: NONREACTIVE
HDLC SERPL-MCNC: 73 MG/DL
HDLC SERPL: 2.8 (ref 0–5)
HGB BLD-MCNC: 12.7 G/DL (ref 11.5–16)
LDLC SERPL CALC-MCNC: 120 MG/DL (ref 0–100)
MCH RBC QN AUTO: 30 PG (ref 26–34)
MCHC RBC AUTO-ENTMCNC: 34 G/DL (ref 30–36.5)
MCV RBC AUTO: 88.2 FL (ref 80–99)
NRBC # BLD: 0 K/UL (ref 0–0.01)
NRBC BLD-RTO: 0 PER 100 WBC
PLATELET # BLD AUTO: 208 K/UL (ref 150–400)
PMV BLD AUTO: 10 FL (ref 8.9–12.9)
POTASSIUM SERPL-SCNC: 4.4 MMOL/L (ref 3.5–5.1)
PROT SERPL-MCNC: 7.2 G/DL (ref 6.4–8.2)
RBC # BLD AUTO: 4.23 M/UL (ref 3.8–5.2)
SODIUM SERPL-SCNC: 141 MMOL/L (ref 136–145)
TRIGL SERPL-MCNC: 55 MG/DL
TSH SERPL DL<=0.05 MIU/L-ACNC: 1.74 UIU/ML (ref 0.36–3.74)
VLDLC SERPL CALC-MCNC: 11 MG/DL
WBC # BLD AUTO: 4.8 K/UL (ref 3.6–11)

## 2024-01-29 PROCEDURE — 99396 PREV VISIT EST AGE 40-64: CPT | Performed by: FAMILY MEDICINE

## 2024-01-29 SDOH — ECONOMIC STABILITY: FOOD INSECURITY: WITHIN THE PAST 12 MONTHS, YOU WORRIED THAT YOUR FOOD WOULD RUN OUT BEFORE YOU GOT MONEY TO BUY MORE.: NEVER TRUE

## 2024-01-29 SDOH — ECONOMIC STABILITY: INCOME INSECURITY: HOW HARD IS IT FOR YOU TO PAY FOR THE VERY BASICS LIKE FOOD, HOUSING, MEDICAL CARE, AND HEATING?: NOT HARD AT ALL

## 2024-01-29 SDOH — ECONOMIC STABILITY: HOUSING INSECURITY
IN THE LAST 12 MONTHS, WAS THERE A TIME WHEN YOU DID NOT HAVE A STEADY PLACE TO SLEEP OR SLEPT IN A SHELTER (INCLUDING NOW)?: NO

## 2024-01-29 SDOH — ECONOMIC STABILITY: FOOD INSECURITY: WITHIN THE PAST 12 MONTHS, THE FOOD YOU BOUGHT JUST DIDN'T LAST AND YOU DIDN'T HAVE MONEY TO GET MORE.: NEVER TRUE

## 2024-01-29 ASSESSMENT — ENCOUNTER SYMPTOMS
RESPIRATORY NEGATIVE: 1
EYES NEGATIVE: 1
GASTROINTESTINAL NEGATIVE: 1
ALLERGIC/IMMUNOLOGIC NEGATIVE: 1

## 2024-01-29 ASSESSMENT — PATIENT HEALTH QUESTIONNAIRE - PHQ9
SUM OF ALL RESPONSES TO PHQ QUESTIONS 1-9: 0
SUM OF ALL RESPONSES TO PHQ9 QUESTIONS 1 & 2: 0
1. LITTLE INTEREST OR PLEASURE IN DOING THINGS: 0
2. FEELING DOWN, DEPRESSED OR HOPELESS: 0
SUM OF ALL RESPONSES TO PHQ QUESTIONS 1-9: 0
SUM OF ALL RESPONSES TO PHQ QUESTIONS 1-9: 0

## 2024-01-29 NOTE — PROGRESS NOTES
Chief Complaint   Patient presents with    Annual Exam     fasting     1. \"Have you been to the ER, urgent care clinic since your last visit?  Hospitalized since your last visit?\" No none in the last 6 month    2. \"Have you seen or consulted any other health care providers outside of the [facility] since your last visit?\" gyn    3. For patients aged 45-75: Has the patient had a colonoscopy / FIT/ Cologuard? Yes - no Care Gap present 10/2020 Normal, Repeat 10 yrs, COLONOSCOPY performed by Axel Reardon MD at [facility] ENDOSCOPY       If the patient is female:    4. For patients aged 40-74: Has the patient had a mammogram within the past 2 years? Yes - no Care Gap present 12/2023      5. For patients aged 21-65: Has the patient had a pap smear? Yes - no Care Gap present gyn NP Nani Smith @ [facility] earlier this month

## 2024-01-29 NOTE — PROGRESS NOTES
Chief Complaint   Patient presents with    Annual Exam     Fasting     HISTORY OF PRESENT ILLNESS   HPI  Annual CPE  Fasting for labs  Diet: follows sensible, balanced diet; admits she does love chips and occasional sweets and is back to eating those again lately; has been doing intermittent fasting since 1-1-24  Caffeine: 2-3 servings of black coffee a day, occ tea, no sodas    Etoh: occasional, wine on special occasions  Exercise: run/walk on treadmill or in neighborhood 3-4 x a week x 45-60 minutes, strength work 2 x a week   Weight: stable over the years in the 140's     Sees gyn for well woman visits  On HRT patch for  symptoms s/p hysterectomy 2016 for fibroids  Still gets some hot flashes/night sweats  Gyn just recently recommended Estroven so she plans to start that and give it a try for a while  We also discussed other natural/supplements as well as dietary modifications and minimizing caffeine consumption. Suggested low dose Effexor as add on to her E- patch and she can pursue this either through me or her Gyn if the need arises     REVIEW OF SYMPTOMS   Review of Systems   Constitutional: Negative.    HENT: Negative.     Eyes: Negative.    Respiratory: Negative.     Cardiovascular: Negative.    Gastrointestinal: Negative.    Genitourinary: Negative.    Allergic/Immunologic: Negative.    Neurological: Negative.    Hematological: Negative.    Psychiatric/Behavioral: Negative.               PROBLEM LIST/MEDICAL HISTORY     Patient Active Problem List    Diagnosis Date Noted    Elevated hemoglobin A1c 11/10/2021     Overview Note:     Insurance Physical 7-2021: HgbA1c 5.7; Repeated PCP : 5.3        Environmental and seasonal allergies 07/08/2016     Overview Note:     Takes Zyrtec prn spring and fall      Iron deficiency anemia 05/02/2016     Overview Note:     Mild        Screening for cardiovascular condition 03/27/2015     Overview Note:     Strong family h/o cardiac arrhythmias; Consult

## 2024-02-17 ENCOUNTER — TELEPHONE (OUTPATIENT)
Age: 54
End: 2024-02-17

## 2024-02-18 NOTE — TELEPHONE ENCOUNTER
I received an unread message alert that this patient has not checked the results and recommendations I sent via their MyChart. Please either call alerting them to check their Patient Portal for email and that a full report is in their [email or web address] account as well. Thanks!

## 2024-02-19 NOTE — TELEPHONE ENCOUNTER
LVM for pt that Dr Metz had sent her a [employer] message regarding her results on 2/3.  I just wanted to make sure she was aware.

## 2024-02-21 DIAGNOSIS — R73.03 PREDIABETES: Primary | ICD-10-CM

## 2025-01-03 ENCOUNTER — HOSPITAL ENCOUNTER (OUTPATIENT)
Facility: HOSPITAL | Age: 55
Discharge: HOME | End: 2025-01-03
Payer: COMMERCIAL

## 2025-01-03 VITALS — HEIGHT: 66 IN | BODY MASS INDEX: 23.3 KG/M2 | WEIGHT: 145 LBS

## 2025-01-03 DIAGNOSIS — Z12.31 VISIT FOR SCREENING MAMMOGRAM: ICD-10-CM

## 2025-01-03 PROCEDURE — 77063 BREAST TOMOSYNTHESIS BI: CPT

## 2025-04-28 ENCOUNTER — OFFICE VISIT (OUTPATIENT)
Age: 55
End: 2025-04-28
Payer: COMMERCIAL

## 2025-04-28 VITALS
RESPIRATION RATE: 16 BRPM | DIASTOLIC BLOOD PRESSURE: 72 MMHG | OXYGEN SATURATION: 98 % | HEART RATE: 61 BPM | BODY MASS INDEX: 23.78 KG/M2 | SYSTOLIC BLOOD PRESSURE: 126 MMHG | TEMPERATURE: 98.1 F | WEIGHT: 148 LBS | HEIGHT: 66 IN

## 2025-04-28 DIAGNOSIS — Z23 ENCOUNTER FOR IMMUNIZATION: ICD-10-CM

## 2025-04-28 DIAGNOSIS — E78.00 MILD HYPERCHOLESTEROLEMIA: ICD-10-CM

## 2025-04-28 DIAGNOSIS — R73.03 PREDIABETES: ICD-10-CM

## 2025-04-28 DIAGNOSIS — R35.1 NOCTURIA: ICD-10-CM

## 2025-04-28 DIAGNOSIS — N95.1 POSTMENOPAUSAL SYNDROME: ICD-10-CM

## 2025-04-28 DIAGNOSIS — E55.9 VITAMIN D DEFICIENCY: ICD-10-CM

## 2025-04-28 DIAGNOSIS — Z00.00 ANNUAL PHYSICAL EXAM: Primary | ICD-10-CM

## 2025-04-28 DIAGNOSIS — Z00.00 ANNUAL PHYSICAL EXAM: ICD-10-CM

## 2025-04-28 DIAGNOSIS — N95.2 POSTMENOPAUSAL ATROPHIC VAGINITIS: ICD-10-CM

## 2025-04-28 PROCEDURE — 99396 PREV VISIT EST AGE 40-64: CPT | Performed by: FAMILY MEDICINE

## 2025-04-28 PROCEDURE — 90471 IMMUNIZATION ADMIN: CPT | Performed by: FAMILY MEDICINE

## 2025-04-28 PROCEDURE — 90677 PCV20 VACCINE IM: CPT | Performed by: FAMILY MEDICINE

## 2025-04-28 RX ORDER — ESTRADIOL 1 MG/1
1.5 TABLET ORAL DAILY
COMMUNITY

## 2025-04-28 SDOH — ECONOMIC STABILITY: FOOD INSECURITY: WITHIN THE PAST 12 MONTHS, YOU WORRIED THAT YOUR FOOD WOULD RUN OUT BEFORE YOU GOT MONEY TO BUY MORE.: NEVER TRUE

## 2025-04-28 SDOH — ECONOMIC STABILITY: FOOD INSECURITY: WITHIN THE PAST 12 MONTHS, THE FOOD YOU BOUGHT JUST DIDN'T LAST AND YOU DIDN'T HAVE MONEY TO GET MORE.: NEVER TRUE

## 2025-04-28 ASSESSMENT — ENCOUNTER SYMPTOMS
GASTROINTESTINAL NEGATIVE: 1
ABDOMINAL PAIN: 0
EYES NEGATIVE: 1
RESPIRATORY NEGATIVE: 1
DIARRHEA: 0
NAUSEA: 0
VOMITING: 0
CONSTIPATION: 0
SHORTNESS OF BREATH: 0

## 2025-04-28 ASSESSMENT — PATIENT HEALTH QUESTIONNAIRE - PHQ9
SUM OF ALL RESPONSES TO PHQ QUESTIONS 1-9: 0
2. FEELING DOWN, DEPRESSED OR HOPELESS: NOT AT ALL
SUM OF ALL RESPONSES TO PHQ QUESTIONS 1-9: 0
1. LITTLE INTEREST OR PLEASURE IN DOING THINGS: NOT AT ALL
SUM OF ALL RESPONSES TO PHQ QUESTIONS 1-9: 0
SUM OF ALL RESPONSES TO PHQ QUESTIONS 1-9: 0

## 2025-04-28 NOTE — PROGRESS NOTES
Chief Complaint   Patient presents with    Annual Exam     Not fasting    pre DM     1. \"Have you been to the ER, urgent care clinic since your last visit?  Hospitalized since your last visit?\" No    2. \"Have you seen or consulted any other health care providers outside of the [facility] since your last visit?\" gyn    3. For patients aged 45-75: Has the patient had a colonoscopy / FIT/ Cologuard? Yes - no Care Gap present 10/2020 Normal, Repeat 10 yrs, COLONOSCOPY performed by Axel Reardon MD at [facility] ENDOSCOPY       If the patient is female:    4. For patients aged 40-74: Has the patient had a mammogram within the past 2 years? Yes - no Care Gap present 1/2025      5. For patients aged 21-65: Has the patient had a pap smear? Yes - no Care Gap present gyn Dr Mckeon @ [facility] in Jan       
2016     Overview Note:     Takes Zyrtec prn spring and fall      Iron deficiency anemia 2016     Overview Note:     Mild        Family history of sudden cardiac death (SCD) 2015    Family history of cardiac arrhythmia 2015     Overview Note:     Oldest sister and brother next to her had genetic testing, negative; patient had cardiac workup per EP Dr. Ross at [facility]  w/ annual screenings through 2018 then released to follow up prn      Vitamin D deficiency 2011     Overview Note:     2010        Seasonal allergic rhinitis 2010     Overview Note:     Spring/        Eczema 2010           PAST SURGICAL HISTORY     Past Surgical History:   Procedure Laterality Date    BREAST BIOPSY Right 2007    Surgical Bx - Benign      SECTION  3/2008, 2006    COLONOSCOPY N/A 10/23/2020    Normal, Repeat 10 yrs, COLONOSCOPY performed by [facility]el [facility]ardon [facility]    HYSTERECTOMY (CERVIX STATUS UNKNOWN)  2016    Uterine Fibroids, Ovaries intact; Dr Sonal Yousif    [facility] US GUID NDL BIOPSY RIGHT Right 2006    Benign    MYOMECTOMY  2002    TONSILLECTOMY AND ADENOIDECTOMY  1984    age 14         MEDICATIONS     Current Outpatient Medications   Medication Sig    estradiol (ESTRACE) 1 MG tablet Take 1.5 tablets by mouth daily Per Gyn    Multiple Vitamins-Minerals (MULTIVITAMIN WOMEN 50+ PO) Take by mouth daily Includes Vitamin D3    cetirizine (ZYRTEC) 10 MG tablet Take 1 tablet by mouth daily as needed Prn seasonal allergies     No current facility-administered medications for this visit.      ALLERGIES   No Known Allergies   SOCIAL HISTORY     Social History     Tobacco Use    Smoking status: Never    Smokeless tobacco: Never   Substance Use Topics    Alcohol use: Yes     Comment: occ wine on special occasions     Social History     Social History Narrative        2 sons who attend [employer], 11th and 12th grade (going to [employer] in

## 2025-06-27 LAB
ERYTHROCYTE [DISTWIDTH] IN BLOOD BY AUTOMATED COUNT: 12.6 % (ref 11.7–15.4)
HBA1C MFR BLD: 5.7 % (ref 4.8–5.6)
HCT VFR BLD AUTO: 40.5 % (ref 34–46.6)
HGB BLD-MCNC: 13.1 G/DL (ref 11.1–15.9)
MCH RBC QN AUTO: 30 PG (ref 26.6–33)
MCHC RBC AUTO-ENTMCNC: 32.3 G/DL (ref 31.5–35.7)
MCV RBC AUTO: 93 FL (ref 79–97)
PLATELET # BLD AUTO: 203 X10E3/UL (ref 150–450)
RBC # BLD AUTO: 4.37 X10E6/UL (ref 3.77–5.28)
WBC # BLD AUTO: 4.3 X10E3/UL (ref 3.4–10.8)

## 2025-06-28 LAB
25(OH)D3+25(OH)D2 SERPL-MCNC: 48 NG/ML (ref 30–100)
ALBUMIN SERPL-MCNC: 4.7 G/DL (ref 3.8–4.9)
ALP SERPL-CCNC: 55 IU/L (ref 44–121)
ALT SERPL-CCNC: 19 IU/L (ref 0–32)
APPEARANCE UR: CLEAR
AST SERPL-CCNC: 22 IU/L (ref 0–40)
BACTERIA #/AREA URNS HPF: NORMAL /[HPF]
BILIRUB SERPL-MCNC: 0.3 MG/DL (ref 0–1.2)
BILIRUB UR QL STRIP: NEGATIVE
BUN SERPL-MCNC: 18 MG/DL (ref 6–24)
BUN/CREAT SERPL: 25 (ref 9–23)
CALCIUM SERPL-MCNC: 9.9 MG/DL (ref 8.7–10.2)
CASTS URNS QL MICRO: NORMAL /LPF
CHLORIDE SERPL-SCNC: 104 MMOL/L (ref 96–106)
CHOLEST SERPL-MCNC: 215 MG/DL (ref 100–199)
CO2 SERPL-SCNC: 25 MMOL/L (ref 20–29)
COLOR UR: YELLOW
CREAT SERPL-MCNC: 0.71 MG/DL (ref 0.57–1)
EGFRCR SERPLBLD CKD-EPI 2021: 102 ML/MIN/1.73
EPI CELLS #/AREA URNS HPF: NORMAL /HPF (ref 0–10)
GLOBULIN SER CALC-MCNC: 2.4 G/DL (ref 1.5–4.5)
GLUCOSE SERPL-MCNC: 87 MG/DL (ref 70–99)
GLUCOSE UR QL STRIP: NEGATIVE
HDLC SERPL-MCNC: 77 MG/DL
HGB UR QL STRIP: NEGATIVE
IMP & REVIEW OF LAB RESULTS: NORMAL
KETONES UR QL STRIP: NEGATIVE
LDLC SERPL CALC-MCNC: 129 MG/DL (ref 0–99)
LEUKOCYTE ESTERASE UR QL STRIP: NEGATIVE
MICRO URNS: NORMAL
MICRO URNS: NORMAL
NITRITE UR QL STRIP: NEGATIVE
PH UR STRIP: 7.5 [PH] (ref 5–7.5)
POTASSIUM SERPL-SCNC: 4.5 MMOL/L (ref 3.5–5.2)
PROT SERPL-MCNC: 7.1 G/DL (ref 6–8.5)
PROT UR QL STRIP: NEGATIVE
RBC #/AREA URNS HPF: NORMAL /HPF (ref 0–2)
SODIUM SERPL-SCNC: 141 MMOL/L (ref 134–144)
SP GR UR STRIP: 1.02 (ref 1–1.03)
TRIGL SERPL-MCNC: 50 MG/DL (ref 0–149)
TSH SERPL DL<=0.005 MIU/L-ACNC: 1.78 UIU/ML (ref 0.45–4.5)
URINALYSIS REFLEX: NORMAL
UROBILINOGEN UR STRIP-MCNC: 0.2 MG/DL (ref 0.2–1)
VLDLC SERPL CALC-MCNC: 9 MG/DL (ref 5–40)
WBC #/AREA URNS HPF: NORMAL /HPF (ref 0–5)

## 2025-06-29 ENCOUNTER — RESULTS FOLLOW-UP (OUTPATIENT)
Age: 55
End: 2025-06-29

## (undated) DEVICE — SYR 3ML LL TIP 1/10ML GRAD --

## (undated) DEVICE — TOWEL 4 PLY TISS 19X30 SUE WHT

## (undated) DEVICE — SET ADMIN 16ML TBNG L100IN 2 Y INJ SITE IV PIGGY BK DISP

## (undated) DEVICE — BASIN EMSIS 16OZ GRAPHITE PLAS KID SHP MOLD GRAD FOR ORAL

## (undated) DEVICE — SYRINGE 50ML E/T

## (undated) DEVICE — Z DISCONTINUED PER MEDLINE LINE GAS SAMPLING O2/CO2 LNG AD 13 FT NSL W/ TBNG FILTERLINE

## (undated) DEVICE — SYR 10ML LUER LOK 1/5ML GRAD --

## (undated) DEVICE — 1200 GUARD II KIT W/5MM TUBE W/O VAC TUBE: Brand: GUARDIAN

## (undated) DEVICE — NEONATAL-ADULT SPO2 SENSOR: Brand: NELLCOR

## (undated) DEVICE — NEEDLE HYPO 18GA L1.5IN PNK S STL HUB POLYPR SHLD REG BVL

## (undated) DEVICE — Device

## (undated) DEVICE — SOLIDIFIER FLD 2OZ 1500CC N DISINF IN BTL DISP SAFESORB

## (undated) DEVICE — ELECTRODE,RADIOTRANSLUCENT,FOAM,5PK: Brand: MEDLINE

## (undated) DEVICE — CATH IV AUTOGRD BC BLU 22GA 25 -- INSYTE